# Patient Record
Sex: FEMALE | Employment: STUDENT | URBAN - METROPOLITAN AREA
[De-identification: names, ages, dates, MRNs, and addresses within clinical notes are randomized per-mention and may not be internally consistent; named-entity substitution may affect disease eponyms.]

---

## 2021-05-03 ENCOUNTER — EMPLOYEE HEALTH (OUTPATIENT)
Dept: OTHER | Facility: HOSPITAL | Age: 21
End: 2021-05-03
Attending: PREVENTIVE MEDICINE

## 2021-05-03 DIAGNOSIS — Z11.1 SCREENING-PULMONARY TB: Primary | ICD-10-CM

## 2021-05-03 PROCEDURE — 86480 TB TEST CELL IMMUN MEASURE: CPT

## 2022-10-24 ENCOUNTER — IMMUNIZATION (OUTPATIENT)
Dept: LAB | Facility: HOSPITAL | Age: 22
End: 2022-10-24
Attending: PREVENTIVE MEDICINE

## 2022-10-24 DIAGNOSIS — Z23 NEED FOR VACCINATION: Primary | ICD-10-CM

## 2022-10-24 PROCEDURE — 90471 IMMUNIZATION ADMIN: CPT

## 2025-01-07 ENCOUNTER — OFFICE VISIT (OUTPATIENT)
Facility: LOCATION | Age: 25
End: 2025-01-07
Payer: COMMERCIAL

## 2025-01-07 VITALS
HEART RATE: 76 BPM | DIASTOLIC BLOOD PRESSURE: 64 MMHG | WEIGHT: 116 LBS | BODY MASS INDEX: 19.81 KG/M2 | SYSTOLIC BLOOD PRESSURE: 110 MMHG | HEIGHT: 64 IN

## 2025-01-07 DIAGNOSIS — E05.90 HYPERTHYROIDISM: ICD-10-CM

## 2025-01-07 DIAGNOSIS — E05.00 GRAVES DISEASE: Primary | ICD-10-CM

## 2025-01-07 DIAGNOSIS — T50.905S ADVERSE EFFECT OF DRUG, SEQUELA: ICD-10-CM

## 2025-01-07 DIAGNOSIS — E07.9 THYROID DISEASE: ICD-10-CM

## 2025-01-07 PROCEDURE — 99205 OFFICE O/P NEW HI 60 MIN: CPT | Performed by: INTERNAL MEDICINE

## 2025-01-07 RX ORDER — PROPYLTHIOURACIL 50 MG/1
50 TABLET ORAL 2 TIMES DAILY
Qty: 60 TABLET | Refills: 0 | Status: SHIPPED | OUTPATIENT
Start: 2025-01-07

## 2025-01-07 NOTE — PROGRESS NOTES
New Patient Evaluation - History and Physical    CONSULT - Reason for Visit:    Graves, hyperthyroid and reaction to MMI  Requesting Physician: Elana Dubin, PA   ..No primary care provider on file.  No referring provider defined for this encounter.    CHIEF COMPLAINT:    Chief Complaint   Patient presents with    Thyroid Problem     Consult         HISTORY OF PRESENT ILLNESS:   Jania Tapia is a 24 year old female who presents with  Graves, hyperthyroid and reaction to MMI  Previous endo Brian Becerril MD  . Reviewed notes. Pt has new insurance now. She works at [employer] in [employer].   Outside records showed high TSH, hyperthyroid.   She was dx in 10/2024 w/ hyperthyroid on routine labs  W/u showed TSI and thyroiditis on US.    She was started on MMI 10 mg bid 12/9/2024  1st 2  weeks she as ok   On 12/23/2024, had hives and itching  stopped 1/1/2025. She tried lower doses 10 mg every day but still had the hives.   Lino resolved when she stopped MMI       Has tremors.   Denied other hyperthyroid sx. She feels fine now.   She is zertec now  She is vit D , probiotic,   M and MGF with graves disease      Compression symptoms: denied except the underlined ones SOB, dysphagia, odynophagia, change in voice, hoarseness, neck pain or neck mass.     The patient endorses the bolded symptoms: intolerance to cold, constipation, decreased lacrimation, fatigue; anxiety, heat intolerance, insomnia, tremors, wt loss, wt gain, irregular menses, lid lag, palpitations, proptosis, thinning hair; dyspnea, difficulty breathing when lying down, dysphagia, sensation of food getting stuck in the throat, choking sensation when lying flat, pooling of saliva, dysphonia, voice changes, hoarseness; none.    No eye or vision changes.   No heat or cold intolerance  Hair and skin: hives   Menstrual hx: No LMP recorded. LMP 12/19, regular , not on ocp  Neck surgery: No  Neck radiation: No   Biotin: no  Turmeric:no  Family history of thyroid  cancer in 1st degree relatives: no         ASSESSMENT AND PLAN:  Jania Tapia is a 24 year old female who presents with    Graves, hyperthyroid and reaction to MMI    Plan  Labs today   Avoid pregnancy   Will start low dose PTU 50 mg  twice a day and stop if gets reaction to it   Will consider propranolol   Will refer to get surgery consult to discuss surgery option   Will refer to ophtho eye exam for graves eye disease - does not have vision insurance. So might skip     Discussed with patient possible dx, treatment options, SILVA vs surgery vs meds. Discussed thyroid and pregnancy (if applicable), wt gain, eye disease, and SE of meds and SILVA. PTU/Methimazole may cause significant bone marrow depression; the most severe manifestation is agranulocytosis. May also cause Hepatotoxicity (including acute liver failure) Symptoms suggestive of hepatic dysfunction (eg, anorexia, pruritus, right upper quadrant pain) should prompt evaluation. If you have nausea, vomiting, abdominal pain, jaundice- yellow skin or eye color-, dark urine, light stool color, fever, sore throat or infection, call us or the PCP.  Remission rate of ~ 40% with use of antithyroid drugs for 1-1.5 years, their side effects, monitoring, and follow-up issues, specifically agranulocytosis, liver toxicity, anaphylaxis, rash, lupus like syndrome, metallic taste in the mouth, and joint aches. We discussed that patient should discontinue methimazole at the earliest sign of a fever, sore throat, or other infection, should call our office and have WBC count with differential done. The drug should be held until the result is available.     If applicable, Hyperthyroid pregnancy counseling. General counseling on contraception (Z30.09).  We discussed in details the adverse effect of uncontrolled hyperthyroidism on pregnancy    Also discussed the risk of Methimazole on the fetus.   Please notify us if you are pregnant or you are planning to get pregnant.   Print out was  given to the Pt    HYPERTENSION  [email or web address]    If blood pressure is high, monitor blood pressure at home and follow up with primary care.   Some people's blood pressure readings differ between the doctor's office and at home.     Am I at Risk?  There is no single identifiable cause of hypertension. Many factors can contribute, including:   Being overweight or obese   Eating a diet high in sodium (salt)   Not getting enough physical activity   Being older or    Smoking   Drinking too much alcohol   Having a personal or family history of hypertension   Having other chronic diseases, especially diabetes or kidney disease      PAST MEDICAL HISTORY:   History reviewed. No pertinent past medical history.  Graves, hyperthyroid   PAST SURGICAL HISTORY:   History reviewed. No pertinent surgical history.  no  CURRENT MEDICATIONS:     propylthiouracil 50 MG Oral Tab Take 1 tablet (50 mg total) by mouth in the morning and 1 tablet (50 mg total) before bedtime. 60 tablet 0       ALLERGIES:  Allergies[1]    SOCIAL HISTORY:    Social History     Socioeconomic History    Marital status: Single   Tobacco Use    Smoking status: Never    Smokeless tobacco: Never     Works in the labs  Smoking no  Marijuana no  Etoh rare  Drugs no  Single, not planning pregnancy     FAMILY HISTORY:   History reviewed. No pertinent family history.  M and MGF with graves disease     REVIEW OF SYSTEMS:  All negative other than HPI    PHYSICAL EXAM:   Height: 5' 4\" (162.6 cm) (01/07 1001)  Weight: 116 lb (52.6 kg) (01/07 1001)  BSA (Calculated - sq m): 1.55 sq meters (01/07 1001)  Pulse: 76 (01/07 1031)  BP: 110/64 (01/07 1001)  Temp: --  Do Not Use - Resp Rate: --  SpO2: --    Body mass index is 19.91 kg/m².  Tremors   CONSTITUTIONAL:  Awake and alert. Age appropriate, good hygiene not in acute distress. Well-nourished and well developed. no  acute distress   PSYCH:    Normal mood and affect,   cooperative  Neuro: speech is clear. Awake, alert, no aphasia, no facial asymmetry,    EYES:  No proptosis, no ptosis, conjunctiva normal  ENT:  Normocephalic, atraumatic  Eye: EOMI, normal lids, no discharge,    No rhinorrhea, moist oral mucosa  Neck: full range of motion  Neck/Thyroid: neck inspection: normal, No scar, No goiter   LUNGS:  No acute respiratory distress, non-labored respiration. Speaking full sentences  CARDIOVASCULAR:  regular rate   ABDOMEN:  No abdominal pain.   SKIN:  Skin is dry, no obvious rashes or lesions  EXTREMITIES: no gross abnormality   MSK: Moves extremities spontaneously. full range of motion in all major joints      DATA:     Pertinent data reviewed  12/2024 US thyroid IMPRESSION:     Diffusely heterogeneous thyroid without discrete nodule identified. Findings can be seen with thyroiditis     12/4/24 10:29 AM    TSI - External  <140 % baseline 194 High      12/4/24 10:29 AM    TRAb Antibody RAJAT method  < OR = 2.00 IU/L 4.29       12/4/24 10:29 AM    TSH - External  mIU/L 0.02       12/4/24 10:29 AM    T3, FREE - External  2.3 - 4.2 pg/mL 11.8 High      ABSOLUTE NEUTROPHILS - External  1500 - 7800 cells/uL 2,08     10/29/24  8:15 AM    TSH - External  mIU/L 0.02 Low        10/29/24  8:15 AM    VITAMIN D,25-OH,TOTAL,IA - External  30 - 100 ng/mL 68     10/29/24  8:15 AM    T4, FREE - External  0.8 - 1.8 ng/dL 3 High        3/28/24  9:04 AM    ALT  0 - 33 U/L 21       10/17/20  7:56 AM    TSH - External  mIU/L 1.63     3/28/24  9:04 AM    AST  0 - 32 U/L 23     No results found.    No results for input(s): \"TSH\", \"T4F\", \"T3F\", \"THYP\" in the last 72 hours.  No results found for: \"TSH\"  No results found for: \"A1C\"        No results for input(s): \"TSH\", \"T4F\", \"T3F\", \"THYP\" in the last 72 hours.  No results found.    Orders Placed This Encounter   Procedures    Free T3 (Triiodothryronine)    Free T4, (Free Thyroxine)    Free T4, (Free  Thyroxine)     Orders Placed This Encounter    Free T3 (Triiodothryronine)     Order Specific Question:   Release to patient     Answer:   Immediate    Free T4, (Free Thyroxine)     Standing Status:   Future     Standing Expiration Date:   1/7/2026     Order Specific Question:   Release to patient     Answer:   Immediate    Free T4, (Free Thyroxine)     Standing Status:   Future     Standing Expiration Date:   1/7/2026     Order Specific Question:   Release to patient     Answer:   Immediate    propylthiouracil 50 MG Oral Tab     Sig: Take 1 tablet (50 mg total) by mouth in the morning and 1 tablet (50 mg total) before bedtime.     Dispense:  60 tablet     Refill:  0          This is a specialized patient consultation in endocrinology and required comprehensive review of prior records, as well as current evaluation, with time required for consideration of complex endocrine issues and consultation. For this visit, I personally interviewed the patient, and family member if accompanied, performed the pertinent parts of the history and physical examination. ROS included screening for appropriate endocrine conditions.   Today's diagnosis and plan were reviewed in detail with the patient who states understanding and agrees with plan. I discussed with the patient possible diagnosis, differential diagnosis, need for work up, treatment options, alternatives and side effects.     Please see note for details about time spent which includes:   · pre-visit preparation  · reviewing records  · face to face time with the patient   · timely documentation of the encounter  · ordering medications/tests  · communication with care team  · care coordination    I appreciate the opportunity to be part of your patient's medical care and will keep you, as the referring and primary physicians, informed about the care of your patient. Please feel free to contact me should you have any questions.    The 21st Century Cures Act makes medical  notes like these available to patients in the interest of transparency. Please be advised this is a medical document. Medical documents are intended to carry relevant information, facts as evident, and the clinical opinion of the practitioner. The medical note is intended as peer to peer communication and may appear blunt or direct. It is written in medical language and may contain abbreviations or verbiage that are unfamiliar.   Shakeel Alcantara MD             [1] No Known Allergies

## 2025-01-08 ENCOUNTER — LAB ENCOUNTER (OUTPATIENT)
Dept: LAB | Facility: HOSPITAL | Age: 25
End: 2025-01-08
Attending: INTERNAL MEDICINE
Payer: COMMERCIAL

## 2025-01-08 DIAGNOSIS — E05.90 HYPERTHYROIDISM: ICD-10-CM

## 2025-01-08 DIAGNOSIS — E07.9 THYROID DISEASE: ICD-10-CM

## 2025-01-08 DIAGNOSIS — E05.00 GRAVES DISEASE: ICD-10-CM

## 2025-01-08 DIAGNOSIS — T50.905S ADVERSE EFFECT OF DRUG, SEQUELA: ICD-10-CM

## 2025-01-08 LAB
T3FREE SERPL-MCNC: 8.48 PG/ML (ref 2.4–4.2)
T4 FREE SERPL-MCNC: 2.6 NG/DL (ref 0.8–1.7)

## 2025-01-08 PROCEDURE — 84439 ASSAY OF FREE THYROXINE: CPT

## 2025-01-08 PROCEDURE — 36415 COLL VENOUS BLD VENIPUNCTURE: CPT | Performed by: INTERNAL MEDICINE

## 2025-01-08 PROCEDURE — 84481 FREE ASSAY (FT-3): CPT | Performed by: INTERNAL MEDICINE

## 2025-01-21 ENCOUNTER — OFFICE VISIT (OUTPATIENT)
Facility: LOCATION | Age: 25
End: 2025-01-21
Payer: COMMERCIAL

## 2025-01-21 ENCOUNTER — PATIENT MESSAGE (OUTPATIENT)
Facility: LOCATION | Age: 25
End: 2025-01-21

## 2025-01-21 VITALS — BODY MASS INDEX: 19.81 KG/M2 | HEIGHT: 64 IN | WEIGHT: 116 LBS

## 2025-01-21 DIAGNOSIS — E05.00 GRAVES DISEASE: Primary | ICD-10-CM

## 2025-01-21 PROCEDURE — 99203 OFFICE O/P NEW LOW 30 MIN: CPT | Performed by: STUDENT IN AN ORGANIZED HEALTH CARE EDUCATION/TRAINING PROGRAM

## 2025-01-21 RX ORDER — TRIAMCINOLONE ACETONIDE 0.25 MG/G
CREAM TOPICAL
COMMUNITY
Start: 2023-02-07

## 2025-01-21 RX ORDER — SPIRONOLACTONE 25 MG/1
25 TABLET ORAL DAILY
COMMUNITY
Start: 2022-07-14

## 2025-01-21 RX ORDER — ISOTRETINOIN 20 MG/1
1 CAPSULE ORAL DAILY
COMMUNITY
Start: 2023-01-06

## 2025-01-21 RX ORDER — ISOTRETINOIN 30 MG/1
1 CAPSULE ORAL 2 TIMES DAILY
COMMUNITY
Start: 2024-04-29

## 2025-01-21 RX ORDER — ISOTRETINOIN 40 MG/1
1 CAPSULE ORAL DAILY
COMMUNITY
Start: 2024-04-01

## 2025-01-21 RX ORDER — METHIMAZOLE 10 MG/1
TABLET ORAL
COMMUNITY
Start: 2024-12-06

## 2025-01-21 RX ORDER — ISOTRETINOIN 30 MG/1
30 CAPSULE ORAL DAILY
COMMUNITY
Start: 2023-01-06

## 2025-01-22 NOTE — PROGRESS NOTES
[place]  OTOLARYNGOLOGY - HEAD & NECK SURGERY    1/21/2025     Reason for Consultation:   Graves' hyperthyroidism    History of Present Illness:   Patient is a pleasant 24 year old female who is being seen for history of Graves' hyperthyroidism.  The patient was tried on medical therapy with methimazole and ended up having an allergic reaction.  She has recently switched to PTU.  She is currently being managed by Dr. Alcantara.  She was referred to me for possible surgical consultation.  The patient has not had any previous neck surgery.  She does have a history of Graves' disease in her family.  She is not currently thinking about pregnancy but states that it is a possibility in the next few years.  She did have a thyroid ultrasound which showed just heterogeneity of the thyroid gland without any distinct nodules.    Past Medical History  History reviewed. No pertinent past medical history.    Past Surgical History  History reviewed. No pertinent surgical history.    Family History  History reviewed. No pertinent family history.    Social History  Pediatric History   Patient Parents    Vianey Khan (Mother)     Other Topics Concern    Not on file   Social History Narrative    Not on file           Current Medications:  Current Outpatient Medications   Medication Sig Dispense Refill    propylthiouracil 50 MG Oral Tab Take 1 tablet (50 mg total) by mouth in the morning and 1 tablet (50 mg total) before bedtime. 60 tablet 0    Isotretinoin 20 MG Oral Cap Take 1 capsule (20 mg total) by mouth daily. (Patient not taking: Reported on 1/21/2025)      Isotretinoin 30 MG Oral Cap Take 1 capsule (30 mg total) by mouth 2 (two) times daily. (Patient not taking: Reported on 1/21/2025)      Isotretinoin 30 MG Oral Cap Take 1 capsule (30 mg total) by mouth daily. (Patient not taking: Reported on 1/21/2025)      Isotretinoin 40 MG Oral Cap Take 1 capsule (40 mg total) by mouth daily. (Patient not taking: Reported on 1/21/2025)       methIMAzole 10 MG Oral Tab  (Patient not taking: Reported on 1/21/2025)      spironolactone 25 MG Oral Tab Take 1 tablet (25 mg total) by mouth daily. (Patient not taking: Reported on 1/21/2025)      triamcinolone 0.025 % External Cream Apply to aa on the arms bid for 14 days then PRN for flares. (Patient not taking: Reported on 1/21/2025)      Desogestrel-Ethinyl Estradiol (ENSKYCE) 0.15-30 MG-MCG Oral Tab Take 1 tablet by mouth daily. (Patient not taking: Reported on 1/21/2025) 3 Package 1    tretinoin 0.025 % External Cream Apply pea sized amount to entire face, avoiding eyelids (Patient not taking: Reported on 1/21/2025) 45 g 3    TRETINOIN 0.05 % External Cream APPLY TO AFFECTED AREAS ON FACE AND BACK DAILY AT BEDTIME AS TOLERATED (Patient not taking: Reported on 1/21/2025) 45 g 0    Hydroquinone 4 % External Cream Apply to discolored areas on face and back BID (Patient not taking: Reported on 1/21/2025) 56.8 g 3       Allergies  Allergies[1]    Review of Systems:   A comprehensive 10 point review of systems was completed.  Pertinent positives and negatives noted in the the HPI.    Physical Exam:   Height 5' 4\" (1.626 m), weight 116 lb (52.6 kg).    GENERAL: No acute distress, Comfortable appearing  FACE: HB 1/6, Normal Animation  HEAD: Normocephalic  EYES: EOMI, pupils equil  EARS: Bilateral Auricles Symmetric  NOSE: Nares patent bilaterally  ORAL CAVITY: Tongue mobile, Oropharynx clear, Floor of mouth clear, Posterior oropharynx normal  NECK: No palpable lymphadenopathy, thyroid palpable but small, nontender    Results:     Laboratory Data:  Lab Results   Component Value Date    T4F 2.6 (H) 01/08/2025         Imaging:  PROCEDURE: US THYROID     HISTORY: Hypothyroidism.     TECHNIQUE: Ultrasound of the thyroid gland was performed.     COMPARISON: None.     FINDINGS:     MEASUREMENTS:     Right lobe: 4.6 x 1.5 x 1.6 cm (CC x AP x WIDTH)   Left lobe:  4.8 x 1.6 x 1.6 cm (CC x AP x WIDTH)   Isthmus: 0.2 cm in  thickness     Echogenicity: The thyroid gland is diffusely heterogenous in echogenicity.     Vascularity: The thyroid gland has normal vascularity.     Nodules (Per ACR guidelines, up to four nodules described): None.       IMPRESSION:     Diffusely heterogeneous thyroid without discrete nodule identified. Findings can be seen with thyroiditis.     FINAL REPORT   Attending Radiologist:  Blaze Vyas MD   Date Signed Off:  12/11/2024 09:24       Impression:       ICD-10-CM    1. Graves disease  E05.00            Recommendations:  I did have an in-depth discussion with the patient regarding thyroid surgery.  We did discuss some of the risks of thyroid surgery including temporary or persistent hypocalcemia, temporary or persistent hoarseness due to injury to the recurrent laryngeal nerves.  We also discussed some bleeding, infection, hematoma or seroma, scarring, need for further surgery.  We did however also discussed the benefits of surgery which would include being able to avoid medications like PTU and methimazole.  We did discuss that treatment of hypothyroidism postsurgically is much easier to manage.  She will give this some thought and I will provide her with a handout via [employer] regarding thyroidectomy.  She will reach out to me if she wishes to pursue surgery.    Thank you for allowing me to participate in the care of your patient.    Reynaldo Rizvi, DO   Otolaryngology/Rhinology, Sinus, and Endoscopic Skull Base Surgery  [facility]   [address] [place] [address] [address] [place] [id number]  Phone [phone]  Fax [phone]  1/21/2025  8:09 PM  1/21/2025          [1]   Allergies  Allergen Reactions    Methimazole [Thiamazole] HIVES

## 2025-01-23 ENCOUNTER — TELEPHONE (OUTPATIENT)
Dept: OTOLARYNGOLOGY | Facility: CLINIC | Age: 25
End: 2025-01-23

## 2025-01-23 NOTE — TELEPHONE ENCOUNTER
Dr. Rizvi, patient wants to move forward with the surgery, she would like to get it done in March.

## 2025-01-31 DIAGNOSIS — E04.1 THYROID NODULE: Primary | ICD-10-CM

## 2025-02-06 ENCOUNTER — TELEPHONE (OUTPATIENT)
Facility: LOCATION | Age: 25
End: 2025-02-06

## 2025-02-06 NOTE — TELEPHONE ENCOUNTER
Rec'd Family Medical Leave Act/disability forms via [employer] on 2/4/25. Rec'd Release of Information on 2/6/25 via [employer]. Logged for processing. Details below. Patient would also like forms uploaded to [employer].    Type of Leave: Continuous  Reason for Leave: Total thyroidectomy sx 3/17/25  Start date of leave: 3/17/25 to 3/30/25, Return to work 3/31/25  End date of leave:  How many flare ups per month/length?:  How many appts per month/length?:   Was Fee and Turnaround info Given?:

## 2025-02-10 ENCOUNTER — LAB ENCOUNTER (OUTPATIENT)
Dept: LAB | Facility: HOSPITAL | Age: 25
End: 2025-02-10
Attending: INTERNAL MEDICINE
Payer: COMMERCIAL

## 2025-02-10 DIAGNOSIS — E05.00 GRAVES DISEASE: ICD-10-CM

## 2025-02-10 LAB — T4 FREE SERPL-MCNC: 1.5 NG/DL (ref 0.8–1.7)

## 2025-02-10 PROCEDURE — 36415 COLL VENOUS BLD VENIPUNCTURE: CPT

## 2025-02-10 PROCEDURE — 84439 ASSAY OF FREE THYROXINE: CPT

## 2025-02-14 DIAGNOSIS — E05.00 GRAVES DISEASE: ICD-10-CM

## 2025-02-14 RX ORDER — PROPYLTHIOURACIL 50 MG/1
50 TABLET ORAL 2 TIMES DAILY
Qty: 60 TABLET | Refills: 0 | Status: SHIPPED | OUTPATIENT
Start: 2025-02-14

## 2025-02-14 NOTE — TELEPHONE ENCOUNTER
Endocrine refill protocol for medications for hypothyroidism and hyperthyroidism    Protocol Criteria:  FAILED Reason: Abnormal labs    If all below requirements are met, send a 90-day supply with 1 refill per provider protocol.    Verify appointment with Endocrinology completed in the last 12 months or scheduled in the next 6 months.    Normal TSH result in the past 12 months   Review recent telephone encounters and mychart communications with patient to ensure a dose change has not occurred since last office visit that was not updated in the medication history list     Last completed office visit:1/7/2025 Shakeel Alcantara MD   Next scheduled Follow up:   Future Appointments   Date Time Provider Department Center   2/25/2025  8:00 AM Shakeel Alcantara MD [facility]      Last TSH result: No results found for: \"TSH\"- 12/4/24 was 0.02

## 2025-02-14 NOTE — TELEPHONE ENCOUNTER
Dr. Rizvi,    **The ACKNOWLEDGE button has been moved to the top right ribbon**    Please sign off on form if you agree to: DISABILITY FOR SURGERY  (place your signature on the first page only)  -From your Inbasket, Highlight the patient and click Chart  -Double click the 2/06/25 Forms Completion telephone encounter  -Scroll down to the Media section  -Click the blue Hyperlink: Disability Dr Rizvi 2/14/25    -Click Acknowledge located in the top right ribbon/menu  -Drag the mouse into the blank space of the document and a + sign will appear. Left click to  electronically sign the document.    Thank you,    MIREYA.

## 2025-02-17 NOTE — TELEPHONE ENCOUNTER
Forms completed and signed. E-Faxed to [employer] [phone]. Forms sent to patient via [employer]. Fax confirmation received.

## 2025-02-25 ENCOUNTER — OFFICE VISIT (OUTPATIENT)
Facility: LOCATION | Age: 25
End: 2025-02-25
Payer: COMMERCIAL

## 2025-02-25 VITALS
SYSTOLIC BLOOD PRESSURE: 80 MMHG | HEIGHT: 64 IN | DIASTOLIC BLOOD PRESSURE: 50 MMHG | BODY MASS INDEX: 20.32 KG/M2 | HEART RATE: 71 BPM | WEIGHT: 119 LBS

## 2025-02-25 DIAGNOSIS — T50.905S ADVERSE EFFECT OF DRUG, SEQUELA: ICD-10-CM

## 2025-02-25 DIAGNOSIS — E07.9 THYROID DISEASE: ICD-10-CM

## 2025-02-25 DIAGNOSIS — E05.90 HYPERTHYROIDISM: ICD-10-CM

## 2025-02-25 DIAGNOSIS — E05.00 GRAVES DISEASE: Primary | ICD-10-CM

## 2025-02-25 PROCEDURE — 99214 OFFICE O/P EST MOD 30 MIN: CPT | Performed by: INTERNAL MEDICINE

## 2025-02-25 RX ORDER — LEVOTHYROXINE SODIUM 75 UG/1
75 TABLET ORAL
Qty: 90 TABLET | Refills: 1 | Status: SHIPPED | OUTPATIENT
Start: 2025-02-25 | End: 2025-08-24

## 2025-02-25 NOTE — PATIENT INSTRUCTIONS
Surgery 3/17  Avoid pregnancy    PTU 50 mg  twice a day till your surgery   Post op will start levothyroxine 75 mcg/day   Thyroid medication dose levothyroxine   Take you medication on empty stomach, not with any other medication or food. Wait 60 minutes before eating. Wait 4 hours before taking Vitamins, Calcium or iron.  Wait 60 minutes before eating. Do not take the medication on the morning of the lab test. Do not take Biotin/Turmeric 1 week before the test.    During pregnancy, we need to increase thyroid medication dose (take double your usual dose on 2 days a week, the rest of the days take the usual dose) and CALL US TO SCHEDULE AN APPT TO DO LABS AND ADJUST THE DOSE.          [email or web address].org/patient-thyroid-information/  https://www.thyroid.org/thyroid-information/      Don’t take your thyroid hormone at the same time as:  Walnuts.  Soybean flour.  Cottonseed meal.  Iron supplements or multivitamins containing iron.  Calcium supplements.  Antacids that contain aluminum, magnesium or calcium.  Some ulcer medicines, such as sucralfate (Carafate).  Some cholesterol-lowering drugs, such as those containing cholestyramine (Prevalite, Locholest) and colestipol (Colestid).  To avoid possible problems, eat these foods or use these products several hours before or after you take your thyroid medicine.    Supplements containing biotin, common in hair and nail products, can make it hard to measure how much thyroid hormone is in the body. Biotin does not affect thyroid hormone levels. But supplements that have biotin should be stopped for at least a week before measuring thyroid function so that the measurement is correct.

## 2025-02-25 NOTE — PROGRESS NOTES
Reason for Visit:  Graves, hyperthyroid and reaction to MMI  Requesting Physician: Elana Dubin, PA ..No primary care provider on file.  No referring provider defined for this encounter.    CHIEF COMPLAINT:    Chief Complaint   Patient presents with    Graves     F/u        HISTORY OF PRESENT ILLNESS:   Jania Tapia is a 24 year old female who presents with  Graves, hyperthyroid and reaction to MMI  Previous endo Brian Becerril MD  . Reviewed notes. Pt has new insurance now. 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   Outside records showed high TSI and hyperthyroid.  She was Dx in 10/2024 w/ hyperthyroid on routine labs.  W/u showed TSI and thyroiditis on US.    She was started on MMI 10 mg bid 12/9/2024.  On 12/23/2024, had hives and itching  stopped 1/1/2025. She tried lower doses 10 mg every day but still had the hives.   Lino resolved when she stopped MMI   1/2025 started PTU 50 mg bid and doing well on it now.   Not itching now   Gained wt   No palpitations   BP on the lower side but no orthostatic changes   She is zertec now  She is vit D , probiotic,   M and MGF with graves disease    Hair and skin: hives   Menstrual hx: No LMP recorded. LMP 12/19, regular , not on ocp  Neck surgery: No  Neck radiation: No   Biotin: no  Turmeric:no  Family history of thyroid cancer in 1st degree relatives: no       Wt Readings from Last 6 Encounters:   02/25/25 119 lb (54 kg)   01/21/25 116 lb (52.6 kg)   01/07/25 116 lb (52.6 kg)         ASSESSMENT AND PLAN:  Jania Tapia is a 24 year old female who presents with   Graves, hyperthyroid and reaction to MMI    Plan  Surgery 3/17 with Dr Cee   Avoid pregnancy    PTU 50 mg  twice a day till your surgery   Post op will start levothyroxine 75 mcg/day   Thyroid medication dose levothyroxine   Take you medication on empty stomach, not with any other medication or food. Wait 60 minutes before eating. Wait 4 hours before taking Vitamins, Calcium or iron.  Wait 60 minutes before  eating. Do not take the medication on the morning of the lab test. Do not take Biotin/Turmeric 1 week before the test.    During pregnancy, we need to increase thyroid medication dose (take double your usual dose on 2 days a week, the rest of the days take the usual dose) and CALL US TO SCHEDULE AN APPT TO DO LABS AND ADJUST THE DOSE.          [email or web address]/patient-thyroid-[email or web address]  [email or web address]://[email or web address]/thyroid-information/      Don’t take your thyroid hormone at the same time as:  Walnuts.  Soybean flour.  Cottonseed meal.  Iron supplements or multivitamins containing iron.  Calcium supplements.  Antacids that contain aluminum, magnesium or calcium.  Some ulcer medicines, such as sucralfate (Carafate).  Some cholesterol-lowering drugs, such as those containing cholestyramine (Prevalite, Locholest) and colestipol (Colestid).  To avoid possible problems, eat these foods or use these products several hours before or after you take your thyroid medicine.    Supplements containing biotin, common in hair and nail products, can make it hard to measure how much thyroid hormone is in the body. Biotin does not affect thyroid hormone levels. But supplements that have biotin should be stopped for at least a week before measuring thyroid function so that the measurement is correct.           PAST MEDICAL HISTORY:   History reviewed. No pertinent past medical history.  Graves, hyperthyroid   PAST SURGICAL HISTORY:   History reviewed. No pertinent surgical history.  no  CURRENT MEDICATIONS:     levothyroxine 75 MCG Oral Tab Take 1 tablet (75 mcg total) by mouth before breakfast. 90 tablet 1    propylthiouracil 50 MG Oral Tab Take 1 tablet (50 mg total) by mouth in the morning and 1 tablet (50 mg total) before bedtime. 60 tablet 0       ALLERGIES:  Allergies[1]    SOCIAL HISTORY:    Social History     Socioeconomic History    Marital status: Single   Tobacco Use    Smoking status: Never    Smokeless tobacco: Never    Vaping Use    Vaping status: Never Used   Substance and Sexual Activity    Alcohol use: Yes    Drug use: Never     Works in the labs  Smoking no  Marijuana no  Etoh rare  Drugs no  Single, not planning pregnancy     FAMILY HISTORY:   History reviewed. No pertinent family history.  M and MGF with graves disease      PHYSICAL EXAM:   Height: 5' 4\" (162.6 cm) (02/25 0807)  Weight: 119 lb (54 kg) (02/25 0807)  BSA (Calculated - sq m): 1.57 sq meters (02/25 0807)  Pulse: 71 (02/25 0807)  BP: 80/50 (02/25 0807)  Temp: --  Do Not Use - Resp Rate: --  SpO2: --    Body mass index is 20.43 kg/m².  Tremors Rt > Lt      DATA:     Pertinent data reviewed  12/2024 US thyroid Diffusely heterogeneous thyroid without discrete nodule identified. Findings can be seen with thyroiditis      02/10/25 15:08   T4,Free (Direct)  1.5     12/4/24 10:29 AM    TSI - External  <140 % baseline 194 High      12/4/24 10:29 AM    TRAb Antibody RAJAT method  < OR = 2.00 IU/L 4.29       ABSOLUTE NEUTROPHILS - External  1500 - 7800 cells/uL 2,08     10/29/24  8:15 AM    TSH - External  mIU/L 0.02 Low        10/29/24  8:15 AM    VITAMIN D,25-OH,TOTAL,IA - External  30 - 100 ng/mL 68     10/29/24  8:15 AM    T4, FREE - External  0.8 - 1.8 ng/dL 3 High        3/28/24  9:04 AM    ALT  0 - 33 U/L 21       10/17/20  7:56 AM    TSH - External  mIU/L 1.63     3/28/24  9:04 AM    AST  0 - 32 U/L 23     No results found.    No results for input(s): \"TSH\", \"T4F\", \"T3F\", \"THYP\" in the last 72 hours.  No results found for: \"TSH\"  No results found for: \"A1C\"        No results for input(s): \"TSH\", \"T4F\", \"T3F\", \"THYP\" in the last 72 hours.  No results found.    Orders Placed This Encounter   Procedures    TSH and Free T4     Orders Placed This Encounter    TSH and Free T4     Standing Status:   Future     Standing Expiration Date:   2/25/2026     Order Specific Question:   Release to patient     Answer:   Immediate    levothyroxine 75 MCG Oral Tab     Sig: Take 1  tablet (75 mcg total) by mouth before breakfast.     Dispense:  90 tablet     Refill:  1          This is a specialized patient consultation in endocrinology and required comprehensive review of prior records, as well as current evaluation, with time required for consideration of complex endocrine issues and consultation. For this visit, I personally interviewed the patient, and family member if accompanied, performed the pertinent parts of the history and physical examination. ROS included screening for appropriate endocrine conditions.   Today's diagnosis and plan were reviewed in detail with the patient who states understanding and agrees with plan. I discussed with the patient possible diagnosis, differential diagnosis, need for work up, treatment options, alternatives and side effects.     Please see note for details about time spent which includes:   · pre-visit preparation  · reviewing records  · face to face time with the patient   · timely documentation of the encounter  · ordering medications/tests  · communication with care team  · care coordination    I appreciate the opportunity to be part of your patient's medical care and will keep you, as the referring and primary physicians, informed about the care of your patient. Please feel free to contact me should you have any questions.    The 21st Century Cures Act makes medical notes like these available to patients in the interest of transparency. Please be advised this is a medical document. Medical documents are intended to carry relevant information, facts as evident, and the clinical opinion of the practitioner. The medical note is intended as peer to peer communication and may appear blunt or direct. It is written in medical language and may contain abbreviations or verbiage that are unfamiliar.   Shakeel Alcantara MD             [1]   Allergies  Allergen Reactions    Methimazole [Thiamazole] HIVES

## 2025-03-10 ENCOUNTER — PATIENT MESSAGE (OUTPATIENT)
Facility: LOCATION | Age: 25
End: 2025-03-10

## 2025-03-10 RX ORDER — FERROUS SULFATE 324(65)MG
1 TABLET, DELAYED RELEASE (ENTERIC COATED) ORAL AS NEEDED
COMMUNITY

## 2025-03-13 NOTE — TELEPHONE ENCOUNTER
Advised patient that pre admission nurse will contact patient regarding medications to take the day of and hold, last time to drink water. Advised pt that other questions to be asked to  the day of surgery.

## 2025-03-14 NOTE — DISCHARGE INSTRUCTIONS
Having Thyroid Surgery  You are having surgery to remove part or all of your thyroid. The thyroid is a gland in the front of the neck. It sits just below the voice box (larynx). The gland’s main job is to make thyroid hormone. This helps control the body’s metabolism. Thyroid surgery may be done to treat an enlarged thyroid (goiter). Or it may be done to remove a lump (nodule). It may also be done to treat an overactive gland (hyperthyroid). Or it may be done to treat a gland that may have cancer cells.  Getting ready for surgery  Tell your provider about all the medicines you take. This includes prescription and over-the-counter medicines. It includes aspirin and other blood thinners. It also includes vitamins, herbs, and other supplements. You may need to stop taking some medicines.  Follow any directions you're given for not eating or drinking before the surgery.  Write down questions you have about the surgery. This helps to make sure the healthcare provider addresses all your concerns before you agree to the procedure. Ask the surgeon how much experience they've had doing thyroid surgeries.    During the surgery  An IV (intravenous) line will be put in your arm or hand. You’ll receive fluids and medicines through the IV during the surgery.  You’ll be given general anesthesia to keep you asleep and pain-free during the surgery.  A cut (incision) is made in your neck, along a crease in your skin.  The incision is then closed with surgical strips, clips, or stitches (sutures).      The incision is made at the base of your neck.      After the surgery  It may take a few hours for the anesthesia to wear off. You'll get up and walk around soon after the surgery. You'll be watched for bleeding.  You may spend some time staying in the hospital or surgery center after the surgery.  In most cases, you can eat and drink the evening after the surgery. You may still have an upset stomach (nausea) from the anesthesia.  You  will be tested to make sure your parathyroid glands are working. The stress of surgery may stop them from working for a short time. If this happens, you may be given calcium and vitamin D pills for a few weeks  You may have a sore throat and a hoarse voice for 1 week or so after the surgery  You will have stitches that will need to be removed 8 days after surgery    Risks and possible complications  The risks and possible complications of this procedure include:  Bleeding  Infection  Damage to nerves in your voice box. This can lead to a hoarse voice. Often this hoarseness gets better over time. But in rare cases it may last.  Damage to the parathyroid glands or their blood supply. This can make them underactive (hypoparathyroidism). These glands control the amount of calcium in your blood. Often the hypoparathyroidism gets better over time. But you may need to take daily calcium pills for a long time, or for the rest of your life. You may also need to take vitamin D supplements.  Damage to the food pipe or airway, which is very rare  [employer] last reviewed this educational content on 10/1/2021  © 4461-6351 [employer]. All rights reserved. This information is not intended as a substitute for professional medical care. Always follow your healthcare professional's instructions.     HOME INSTRUCTIONS  AMBSURG HOME CARE INSTRUCTIONS: POST-OP ANESTHESIA  The medication that you received for sedation or general anesthesia can last up to 24 hours. Your judgment and reflexes may be altered, even if you feel like your normal self.      We Recommend:   Do not drive any motor vehicle or bicycle   Avoid mowing the lawn, playing sports, or working with power tools/applicances (power saws, electric knives or mixers)   That you have someone stay with you on your first night home   Do not drink alcohol or take sleeping pills or tranquilizers   Do not sign legal documents within 24 hours of your procedure   If you had  a nerve block for your surgery, take extra care not to put any pressure on your arm or hand for 24 hours    It is normal:  For you to have a sore throat if you had a breathing tube during surgery (while you were asleep!). The sore throat should get better within 48 hours. You can gargle with warm salt water (1/2 tsp in 4 oz warm water) or use a throat lozenge for comfort  To feel muscle aches or soreness especially in the abdomen, chest or neck. The achy feeling should go away in the next 24 hours  To feel weak, sleepy or \"wiped out\". Your should start feeling better in the next 24 hours.   To experience mild discomforts such as sore lip or tongue, headache, cramps, gas pains or a bloated feeling in your abdomen.   To experience mild back pain or soreness for a day or two if you had spinal or epidural anesthesia.   If you had laparoscopic surgery, to feel shoulder pain or discomfort on the day of surgery.   For some patients to have nausea after surgery/anesthesia    If you feel nausea or experience vomiting:   Try to move around less.   Eat less than usual or drink only liquids until the next morning   Nausea should resolve in about 24 hours    If you have a problem when you are at home:    Call your surgeons office   Discharge Instructions: After Your Surgery  You’ve just had surgery. During surgery, you were given medicine called anesthesia to keep you relaxed and free of pain. After surgery, you may have some pain or nausea. This is common. Here are some tips for feeling better and getting well after surgery.   Going home  Your healthcare provider will show you how to take care of yourself when you go home. They'll also answer your questions. Have an adult family member or friend drive you home. For the first 24 hours after your surgery:   Don't drive or use heavy equipment.  Don't make important decisions or sign legal papers.  Take medicines as directed.  Don't drink alcohol.  Have someone stay with you, if  needed. They can watch for problems and help keep you safe.  Be sure to go to all follow-up visits with your healthcare provider. And rest after your surgery for as long as your provider tells you to.   Coping with pain  If you have pain after surgery, pain medicine will help you feel better. Take it as directed, before pain becomes severe. Also, ask your healthcare provider or pharmacist about other ways to control pain. This might be with heat, ice, or relaxation. And follow any other instructions your surgeon or nurse gives you.      Stay on schedule with your medicine.     Tips for taking pain medicine  To get the best relief possible, remember these points:   Pain medicines can upset your stomach. Taking them with a little food may help.  Most pain relievers taken by mouth need at least 20 to 30 minutes to start to work.  Don't wait till your pain becomes severe before you take your medicine. Try to time your medicine so that you can take it before starting an activity. This might be before you get dressed, go for a walk, or sit down for dinner.  Constipation is a common side effect of some pain medicines. Call your healthcare provider before taking any medicines such as laxatives or stool softeners to help ease constipation. Also ask if you should skip any foods. Drinking lots of fluids and eating foods such as fruits and vegetables that are high in fiber can also help. Remember, don't take laxatives unless your surgeon has prescribed them.  Drinking alcohol and taking pain medicine can cause dizziness and slow your breathing. It can even be deadly. Don't drink alcohol while taking pain medicine.  Pain medicine can make you react more slowly to things. Don't drive or run machinery while taking pain medicine.  Your healthcare provider may tell you to take acetaminophen to help ease your pain. Ask them how much you're supposed to take each day. Acetaminophen or other pain relievers may interact with your  prescription medicines or other over-the-counter (OTC) medicines. Some prescription medicines have acetaminophen and other ingredients in them. Using both prescription and OTC acetaminophen for pain can cause you to accidentally overdose. Read the labels on your OTC medicines with care. This will help you to clearly know the list of ingredients, how much to take, and any warnings. It may also help you not take too much acetaminophen. If you have questions or don't understand the information, ask your pharmacist or healthcare provider to explain it to you before you take the OTC medicine.   Managing nausea  Some people have an upset stomach (nausea) after surgery. This is often because of anesthesia, pain, or pain medicine, less movement of food in the stomach, or the stress of surgery. These tips will help you handle nausea and eat healthy foods as you get better. If you were on a special food plan before surgery, ask your healthcare provider if you should follow it while you get better. Check with your provider on how your eating should progress. It may depend on the surgery you had. These general tips may help:   Don't push yourself to eat. Your body will tell you when to eat and how much.  Start off with clear liquids and soup. They're easier to digest.  Next try semi-solid foods as you feel ready. These include mashed potatoes, applesauce, and gelatin.  Slowly move to solid foods. Don’t eat fatty, rich, or spicy foods at first.  Don't force yourself to have 3 large meals a day. Instead eat smaller amounts more often.  Take pain medicines with a small amount of solid food, such as crackers or toast. This helps prevent nausea.  When to call your healthcare provider  Call your healthcare provider right away if you have any of these:   You still have too much pain, or the pain gets worse, after taking the medicine. The medicine may not be strong enough. Or there may be a complication from the surgery.  You feel too  sleepy, dizzy, or groggy. The medicine may be too strong.  Side effects such as nausea or vomiting. Your healthcare provider may advise taking other medicines to .  Skin changes such as rash, itching, or hives. This may mean you have an allergic reaction. Your provider may advise taking other medicines.  The incision looks different (for instance, part of it opens up).  Bleeding or fluid leaking from the incision site, and weren't told to expect that.  Fever of 100.4°F (38°C) or higher, or as directed by your provider.  Call 911  Call 911 right away if you have:   Trouble breathing  Facial swelling    If you have obstructive sleep apnea   You were given anesthesia medicine during surgery to keep you comfortable and free of pain. After surgery, you may have more apnea spells because of this medicine and other medicines you were given. The spells may last longer than normal.    At home:  Keep using the continuous positive airway pressure (CPAP) device when you sleep. Unless your healthcare provider tells you not to, use it when you sleep, day or night. CPAP is a common device used to treat obstructive sleep apnea.  Talk with your provider before taking any pain medicine, muscle relaxants, or sedatives. Your provider will tell you about the possible dangers of taking these medicines.  Contact your provider if your sleeping changes a lot even when taking medicines as directed.  [employer] last reviewed this educational content on 10/1/2021  © 5069-3487 [employer]. All rights reserved. This information is not intended as a substitute for professional medical care. Always follow your healthcare professional's instructions.

## 2025-03-17 ENCOUNTER — ANESTHESIA (OUTPATIENT)
Dept: SURGERY | Facility: HOSPITAL | Age: 25
End: 2025-03-17
Payer: COMMERCIAL

## 2025-03-17 ENCOUNTER — HOSPITAL ENCOUNTER (OUTPATIENT)
Facility: HOSPITAL | Age: 25
Setting detail: HOSPITAL OUTPATIENT SURGERY
Discharge: HOME | End: 2025-03-17
Attending: STUDENT IN AN ORGANIZED HEALTH CARE EDUCATION/TRAINING PROGRAM | Admitting: STUDENT IN AN ORGANIZED HEALTH CARE EDUCATION/TRAINING PROGRAM
Payer: COMMERCIAL

## 2025-03-17 ENCOUNTER — ANESTHESIA EVENT (OUTPATIENT)
Dept: SURGERY | Facility: HOSPITAL | Age: 25
End: 2025-03-17
Payer: COMMERCIAL

## 2025-03-17 VITALS
HEART RATE: 66 BPM | OXYGEN SATURATION: 100 % | WEIGHT: 119 LBS | DIASTOLIC BLOOD PRESSURE: 68 MMHG | HEIGHT: 64 IN | RESPIRATION RATE: 16 BRPM | BODY MASS INDEX: 20.32 KG/M2 | TEMPERATURE: 98 F | SYSTOLIC BLOOD PRESSURE: 114 MMHG

## 2025-03-17 DIAGNOSIS — E04.1 THYROID NODULE: ICD-10-CM

## 2025-03-17 DIAGNOSIS — E05.00 GRAVES DISEASE: Primary | ICD-10-CM

## 2025-03-17 LAB
B-HCG UR QL: NEGATIVE
CALCIUM BLD-MCNC: 8.7 MG/DL (ref 8.7–10.4)
PTH-INTACT SERPL-MCNC: 29.1 PG/ML (ref 18.5–88)

## 2025-03-17 PROCEDURE — 60240 REMOVAL OF THYROID: CPT | Performed by: STUDENT IN AN ORGANIZED HEALTH CARE EDUCATION/TRAINING PROGRAM

## 2025-03-17 PROCEDURE — 0GTK0ZZ RESECTION OF THYROID GLAND, OPEN APPROACH: ICD-10-PCS | Performed by: STUDENT IN AN ORGANIZED HEALTH CARE EDUCATION/TRAINING PROGRAM

## 2025-03-17 PROCEDURE — 60240 REMOVAL OF THYROID: CPT | Performed by: OTOLARYNGOLOGY

## 2025-03-17 RX ORDER — MORPHINE SULFATE 10 MG/ML
6 INJECTION, SOLUTION INTRAMUSCULAR; INTRAVENOUS EVERY 10 MIN PRN
Status: DISCONTINUED | OUTPATIENT
Start: 2025-03-17 | End: 2025-03-17

## 2025-03-17 RX ORDER — PROCHLORPERAZINE EDISYLATE 5 MG/ML
5 INJECTION INTRAMUSCULAR; INTRAVENOUS EVERY 8 HOURS PRN
Status: DISCONTINUED | OUTPATIENT
Start: 2025-03-17 | End: 2025-03-17

## 2025-03-17 RX ORDER — HYDROMORPHONE HYDROCHLORIDE 1 MG/ML
0.4 INJECTION, SOLUTION INTRAMUSCULAR; INTRAVENOUS; SUBCUTANEOUS EVERY 5 MIN PRN
Status: DISCONTINUED | OUTPATIENT
Start: 2025-03-17 | End: 2025-03-17

## 2025-03-17 RX ORDER — LIDOCAINE HYDROCHLORIDE 40 MG/ML
SOLUTION TOPICAL AS NEEDED
Status: DISCONTINUED | OUTPATIENT
Start: 2025-03-17 | End: 2025-03-17 | Stop reason: SURG

## 2025-03-17 RX ORDER — NALOXONE HYDROCHLORIDE 0.4 MG/ML
0.08 INJECTION, SOLUTION INTRAMUSCULAR; INTRAVENOUS; SUBCUTANEOUS AS NEEDED
Status: DISCONTINUED | OUTPATIENT
Start: 2025-03-17 | End: 2025-03-17

## 2025-03-17 RX ORDER — ROCURONIUM BROMIDE 10 MG/ML
INJECTION, SOLUTION INTRAVENOUS AS NEEDED
Status: DISCONTINUED | OUTPATIENT
Start: 2025-03-17 | End: 2025-03-17 | Stop reason: SURG

## 2025-03-17 RX ORDER — ONDANSETRON 2 MG/ML
INJECTION INTRAMUSCULAR; INTRAVENOUS AS NEEDED
Status: DISCONTINUED | OUTPATIENT
Start: 2025-03-17 | End: 2025-03-17 | Stop reason: SURG

## 2025-03-17 RX ORDER — ACETAMINOPHEN AND CODEINE PHOSPHATE 300; 30 MG/1; MG/1
1 TABLET ORAL ONCE
Status: COMPLETED | OUTPATIENT
Start: 2025-03-17 | End: 2025-03-17

## 2025-03-17 RX ORDER — LIDOCAINE HYDROCHLORIDE AND EPINEPHRINE 10; 10 MG/ML; UG/ML
INJECTION, SOLUTION INFILTRATION; PERINEURAL AS NEEDED
Status: DISCONTINUED | OUTPATIENT
Start: 2025-03-17 | End: 2025-03-17 | Stop reason: HOSPADM

## 2025-03-17 RX ORDER — SODIUM CHLORIDE, SODIUM LACTATE, POTASSIUM CHLORIDE, CALCIUM CHLORIDE 600; 310; 30; 20 MG/100ML; MG/100ML; MG/100ML; MG/100ML
INJECTION, SOLUTION INTRAVENOUS CONTINUOUS
Status: DISCONTINUED | OUTPATIENT
Start: 2025-03-17 | End: 2025-03-17

## 2025-03-17 RX ORDER — MIDAZOLAM HYDROCHLORIDE 1 MG/ML
INJECTION INTRAMUSCULAR; INTRAVENOUS AS NEEDED
Status: DISCONTINUED | OUTPATIENT
Start: 2025-03-17 | End: 2025-03-17 | Stop reason: SURG

## 2025-03-17 RX ORDER — MORPHINE SULFATE 4 MG/ML
4 INJECTION, SOLUTION INTRAMUSCULAR; INTRAVENOUS EVERY 10 MIN PRN
Status: DISCONTINUED | OUTPATIENT
Start: 2025-03-17 | End: 2025-03-17

## 2025-03-17 RX ORDER — HYDROMORPHONE HYDROCHLORIDE 1 MG/ML
0.6 INJECTION, SOLUTION INTRAMUSCULAR; INTRAVENOUS; SUBCUTANEOUS EVERY 5 MIN PRN
Status: DISCONTINUED | OUTPATIENT
Start: 2025-03-17 | End: 2025-03-17

## 2025-03-17 RX ORDER — ACETAMINOPHEN 500 MG
1000 TABLET ORAL ONCE
Status: COMPLETED | OUTPATIENT
Start: 2025-03-17 | End: 2025-03-17

## 2025-03-17 RX ORDER — MORPHINE SULFATE 4 MG/ML
2 INJECTION, SOLUTION INTRAMUSCULAR; INTRAVENOUS EVERY 10 MIN PRN
Status: DISCONTINUED | OUTPATIENT
Start: 2025-03-17 | End: 2025-03-17

## 2025-03-17 RX ORDER — SODIUM CHLORIDE, SODIUM LACTATE, POTASSIUM CHLORIDE, CALCIUM CHLORIDE 600; 310; 30; 20 MG/100ML; MG/100ML; MG/100ML; MG/100ML
INJECTION, SOLUTION INTRAVENOUS CONTINUOUS
Status: CANCELLED | OUTPATIENT
Start: 2025-03-17

## 2025-03-17 RX ORDER — LIDOCAINE HYDROCHLORIDE 10 MG/ML
INJECTION, SOLUTION EPIDURAL; INFILTRATION; INTRACAUDAL; PERINEURAL AS NEEDED
Status: DISCONTINUED | OUTPATIENT
Start: 2025-03-17 | End: 2025-03-17 | Stop reason: SURG

## 2025-03-17 RX ORDER — ACETAMINOPHEN AND CODEINE PHOSPHATE 300; 30 MG/1; MG/1
1 TABLET ORAL EVERY 4 HOURS PRN
Qty: 30 TABLET | Refills: 0 | Status: SHIPPED | OUTPATIENT
Start: 2025-03-17

## 2025-03-17 RX ORDER — ONDANSETRON 2 MG/ML
4 INJECTION INTRAMUSCULAR; INTRAVENOUS EVERY 6 HOURS PRN
Status: DISCONTINUED | OUTPATIENT
Start: 2025-03-17 | End: 2025-03-17

## 2025-03-17 RX ORDER — HYDROMORPHONE HYDROCHLORIDE 1 MG/ML
0.2 INJECTION, SOLUTION INTRAMUSCULAR; INTRAVENOUS; SUBCUTANEOUS EVERY 5 MIN PRN
Status: DISCONTINUED | OUTPATIENT
Start: 2025-03-17 | End: 2025-03-17

## 2025-03-17 RX ORDER — DEXAMETHASONE SODIUM PHOSPHATE 4 MG/ML
VIAL (ML) INJECTION AS NEEDED
Status: DISCONTINUED | OUTPATIENT
Start: 2025-03-17 | End: 2025-03-17 | Stop reason: SURG

## 2025-03-17 RX ADMIN — SODIUM CHLORIDE, SODIUM LACTATE, POTASSIUM CHLORIDE, CALCIUM CHLORIDE: 600; 310; 30; 20 INJECTION, SOLUTION INTRAVENOUS at 13:25:00

## 2025-03-17 RX ADMIN — DEXAMETHASONE SODIUM PHOSPHATE 8 MG: 4 MG/ML VIAL (ML) INJECTION at 12:34:00

## 2025-03-17 RX ADMIN — MIDAZOLAM HYDROCHLORIDE 2 MG: 1 INJECTION INTRAMUSCULAR; INTRAVENOUS at 12:26:00

## 2025-03-17 RX ADMIN — ONDANSETRON 4 MG: 2 INJECTION INTRAMUSCULAR; INTRAVENOUS at 12:34:00

## 2025-03-17 RX ADMIN — SODIUM CHLORIDE, SODIUM LACTATE, POTASSIUM CHLORIDE, CALCIUM CHLORIDE: 600; 310; 30; 20 INJECTION, SOLUTION INTRAVENOUS at 12:26:00

## 2025-03-17 RX ADMIN — LIDOCAINE HYDROCHLORIDE 50 MG: 10 INJECTION, SOLUTION EPIDURAL; INFILTRATION; INTRACAUDAL; PERINEURAL at 12:28:00

## 2025-03-17 RX ADMIN — ROCURONIUM BROMIDE 50 MG: 10 INJECTION, SOLUTION INTRAVENOUS at 12:28:00

## 2025-03-17 RX ADMIN — ROCURONIUM BROMIDE 20 MG: 10 INJECTION, SOLUTION INTRAVENOUS at 13:25:00

## 2025-03-17 RX ADMIN — LIDOCAINE HYDROCHLORIDE 4 ML: 40 SOLUTION TOPICAL at 12:31:00

## 2025-03-17 NOTE — ANESTHESIA PROCEDURE NOTES
Airway  Date/Time: 3/17/2025 12:31 PM  Urgency: Elective    Airway not difficult    General Information and Staff    Patient location during procedure: OR  Anesthesiologist: Jose M Palacios MD  Resident/CRNA: Chela Dominguez CRNA  Performed: CRNA   Performed by: Chela Dominguez CRNA  Authorized by: Jose M Palacios MD      Indications and Patient Condition  Indications for airway management: anesthesia  Sedation level: deep  Preoxygenated: yes  Patient position: sniffing  Mask difficulty assessment: 1 - vent by mask    Final Airway Details  Final airway type: endotracheal airway      Successful airway: ETT  Cuffed: yes   Successful intubation technique: Video laryngoscopy  Endotracheal tube insertion site: oral  Blade: Toan  Blade size: #3  ETT size (mm): 7.0    Cormack-Lehane Classification: grade I - full view of glottis  Placement verified by: capnometry   Cuff volume (mL): 8  Measured from: lips  ETT to lips (cm): 20  Number of attempts at approach: 1  Number of other approaches attempted: 0

## 2025-03-17 NOTE — ANESTHESIA POSTPROCEDURE EVALUATION
Patient: Jania Tapia    Procedure Summary       Date: 03/17/25 Room / Location: [facility] MAIN OR 02 / [facility] MAIN OR    Anesthesia Start: 1225 Anesthesia Stop:     Procedure: Total thyroidectomy (Neck) Diagnosis:       Thyroid nodule      (Thyroid nodule [E04.1])    Surgeons: Reynaldo Rizvi DO Anesthesiologist: Jose M Palacios MD    Anesthesia Type: general ASA Status: 1            Anesthesia Type: general    Vitals Value Taken Time   /75 03/17/25 1353   Temp 97.8 °F (36.6 °C) 03/17/25 1353   Pulse 89 03/17/25 1354   Resp 20 03/17/25 1354   SpO2 97 % 03/17/25 1354   Vitals shown include unfiled device data.    EM AN Post Evaluation:   Patient Evaluated in PACU  Patient Participation: complete - patient participated  Level of Consciousness: sleepy but conscious  Pain Score: 0  Pain Management: adequate  Airway Patency:patent  Dental exam unchanged from preop  Yes    Cardiovascular Status: stable and acceptable  Respiratory Status: acceptable and room air  Postoperative Hydration acceptable      MINOR KENDALL CRNA  3/17/2025 1:54 PM

## 2025-03-17 NOTE — H&P
[place]  OTOLARYNGOLOGY - HEAD & NECK SURGERY    1/31/2025     Reason for Consultation:   Graves' hyperthyroidism    History of Present Illness:   Patient is a pleasant 24 year old female who is being seen for history of Graves' hyperthyroidism.  The patient was tried on medical therapy with methimazole and ended up having an allergic reaction.  She has recently switched to PTU.  She is currently being managed by Dr. Alcantara.  She was referred to me for possible surgical consultation.  The patient has not had any previous neck surgery.  She does have a history of Graves' disease in her family.  She is not currently thinking about pregnancy but states that it is a possibility in the next few years.  She did have a thyroid ultrasound which showed just heterogeneity of the thyroid gland without any distinct nodules.    Past Medical History  Past Medical History:    Disorder of thyroid    Visual impairment    glasses       Past Surgical History  Past Surgical History:   Procedure Laterality Date    [place] teeth removed         Family History  History reviewed. No pertinent family history.    Social History  Pediatric History   Patient Parents    Vianey Khan (Mother)     Other Topics Concern    Not on file   Social History Narrative    Not on file           Current Medications:  No current outpatient medications on file.       Allergies  Allergies[1]    Review of Systems:   A comprehensive 10 point review of systems was completed.  Pertinent positives and negatives noted in the the HPI.    Physical Exam:   Blood pressure 109/70, pulse 62, temperature 98.2 °F (36.8 °C), temperature source Oral, resp. rate 16, height 5' 4\" (1.626 m), weight 119 lb (54 kg), last menstrual period 03/01/2025, SpO2 99%.    GENERAL: No acute distress, Comfortable appearing  FACE: HB 1/6, Normal Animation  HEAD: Normocephalic  EYES: EOMI, pupils equil  EARS: Bilateral Auricles Symmetric  NOSE: Nares patent bilaterally  ORAL CAVITY: Tongue  mobile, Oropharynx clear, Floor of mouth clear, Posterior oropharynx normal  NECK: No palpable lymphadenopathy, thyroid palpable but small, nontender    Results:     Laboratory Data:  Lab Results   Component Value Date    T4F 1.5 02/10/2025         Imaging:  PROCEDURE: US THYROID     HISTORY: Hypothyroidism.     TECHNIQUE: Ultrasound of the thyroid gland was performed.     COMPARISON: None.     FINDINGS:     MEASUREMENTS:     Right lobe: 4.6 x 1.5 x 1.6 cm (CC x AP x WIDTH)   Left lobe:  4.8 x 1.6 x 1.6 cm (CC x AP x WIDTH)   Isthmus: 0.2 cm in thickness     Echogenicity: The thyroid gland is diffusely heterogenous in echogenicity.     Vascularity: The thyroid gland has normal vascularity.     Nodules (Per ACR guidelines, up to four nodules described): None.       IMPRESSION:     Diffusely heterogeneous thyroid without discrete nodule identified. Findings can be seen with thyroiditis.     FINAL REPORT   Attending Radiologist:  Blaze Vyas MD   Date Signed Off:  12/11/2024 09:24       Impression:       ICD-10-CM    1. Graves disease  E05.00            Recommendations:  I did have an in-depth discussion with the patient regarding thyroid surgery.  We did discuss some of the risks of thyroid surgery including temporary or persistent hypocalcemia, temporary or persistent hoarseness due to injury to the recurrent laryngeal nerves.  We also discussed some bleeding, infection, hematoma or seroma, scarring, need for further surgery.  We did however also discussed the benefits of surgery which would include being able to avoid medications like PTU and methimazole.  We did discuss that treatment of hypothyroidism postsurgically is much easier to manage.  She will give this some thought and I will provide her with a handout via [employer] regarding thyroidectomy.  She will reach out to me if she wishes to pursue surgery.    3/17/2025: I again discussed risks of surgery with the patient and answered all questions and concerns. We  will see how she feels post op and will check PTH and Calcium post op to decide on discharge today vs observation overnight    Reynaldo Rizvi,    Otolaryngology/Rhinology, Sinus, and Endoscopic Skull Base Surgery  [address] [place] [address]  Phone [phone]  Fax [phone]  1/21/2025  8:09 PM  1/31/2025          [1]   Allergies  Allergen Reactions    Methimazole [Thiamazole] HIVES

## 2025-03-17 NOTE — ANESTHESIA PREPROCEDURE EVALUATION
Anesthesia PreOp Note    HPI:     Jania Tapia is a 24 year old female who presents for preoperative consultation requested by: Reynaldo Rizvi DO    Date of Surgery: 3/17/2025    Procedure(s):  Total thyroidectomy  Indication: Thyroid nodule [E04.1]    Relevant Problems   No relevant active problems       NPO:  Last Liquid Consumption Date: 03/16/25  Last Liquid Consumption Time: 2230  Last Solid Consumption Date: 03/16/25  Last Solid Consumption Time: 2100  Last Liquid Consumption Date: 03/16/25          History Review:  There are no active problems to display for this patient.      Past Medical History:    Disorder of thyroid    Visual impairment    glasses       Past Surgical History:   Procedure Laterality Date    [place] teeth removed         Prescriptions Prior to Admission[1]  Current Medications and Prescriptions Ordered in Epic[2]    Allergies[3]    History reviewed. No pertinent family history.  Social History     Socioeconomic History    Marital status: Single   Tobacco Use    Smoking status: Never    Smokeless tobacco: Never   Vaping Use    Vaping status: Never Used   Substance and Sexual Activity    Alcohol use: Yes     Comment: monthly    Drug use: Never       Available pre-op labs reviewed.  Lab Results   Component Value Date    URINEPREG Negative 03/17/2025             Vital Signs:  Body mass index is 20.43 kg/m².   height is 1.626 m (5' 4\") and weight is 54 kg (119 lb). Her oral temperature is 98.2 °F (36.8 °C). Her blood pressure is 109/70 and her pulse is 62. Her respiration is 16 and oxygen saturation is 99%.   Vitals:    03/10/25 1238 03/17/25 0957 03/17/25 1000   BP:  109/70    Pulse:  62    Resp:  16    Temp:  98.2 °F (36.8 °C)    TempSrc:  Oral    SpO2:  99%    Weight: 54 kg (119 lb)  54 kg (119 lb)   Height: 1.626 m (5' 4\")          Anesthesia Evaluation     Patient summary reviewed and Nursing notes reviewed    No history of anesthetic complications   Airway   Mallampati: I  TM distance: >3  FB  Neck ROM: full  Dental - Dentition appears grossly intact     Pulmonary - negative ROS and normal exam   Cardiovascular - negative ROS and normal exam  Exercise tolerance: good    Neuro/Psych - negative ROS     GI/Hepatic/Renal - negative ROS     Endo/Other - negative ROS   Abdominal  - normal exam                 Anesthesia Plan:   ASA:  1  Plan:   General  Airway:  ETT  Post-op Pain Management: IV analgesics and Oral pain medication  Informed Consent Plan and Risks Discussed With:  Patient  Discussed plan with:  CRNA      I have informed Jania Tapia and/or legal guardian or family member of the nature of the anesthetic plan, benefits, risks including possible dental damage if relevant, major complications, and any alternative forms of anesthetic management.   All of the patient's questions were answered to the best of my ability. The patient desires the anesthetic management as planned.  ABHIJIT CROSS MD  3/17/2025 10:16 AM  Present on Admission:  **None**           [1]   Medications Prior to Admission   Medication Sig Dispense Refill Last Dose/Taking    Ergocalciferol (VITAMIN D OR) Take 1 tablet by mouth daily.   Past Week    Ferrous Sulfate 324 (65 Fe) MG Oral Tab EC Take 1 tablet by mouth as needed.   Past Week    propylthiouracil 50 MG Oral Tab Take 1 tablet (50 mg total) by mouth in the morning and 1 tablet (50 mg total) before bedtime. 60 tablet 0 3/17/2025 at  8:00 AM    levothyroxine 75 MCG Oral Tab Take 1 tablet (75 mcg total) by mouth before breakfast. 90 tablet 1 Unknown   [2]   Current Facility-Administered Medications Ordered in Epic   Medication Dose Route Frequency Provider Last Rate Last Admin    lactated ringers infusion   Intravenous Continuous Reynaldo Rizvi DO 20 mL/hr at 03/17/25 1013 New Bag at 03/17/25 1013     No current [facility]-ordered outpatient medications on file.   [3]   Allergies  Allergen Reactions    Methimazole [Thiamazole] HIVES

## 2025-03-18 ENCOUNTER — TELEPHONE (OUTPATIENT)
Dept: OTOLARYNGOLOGY | Facility: CLINIC | Age: 25
End: 2025-03-18

## 2025-03-18 ENCOUNTER — PATIENT MESSAGE (OUTPATIENT)
Facility: LOCATION | Age: 25
End: 2025-03-18

## 2025-03-18 NOTE — TELEPHONE ENCOUNTER
Per Dr. Rizvi, is okay to drive as long as patient is not taking narcotics and can turn her head to check her blind spot while driving.  Patient was instructed  to not drive while on narcotics, and she should be able to turn head when called earlier.  [employer] message also sent

## 2025-03-18 NOTE — TELEPHONE ENCOUNTER
Post op day #1 total thyroidectomy    Per patient, is eating soft diet, is drinking fluids well,  Pain is well controlled with tylenol #3, has started taking calcium carbonate-vitamin D as directed, no bleeding, has small amount of old drainage slightly under incision underneath dressing, dressing to neck is clean and dry, aware she can only shower below chest, to not get dressing wet, no heavy lifting over a gallon of milk, no strenuous activity or exercise until cleared by MD, no driving while taking narcotics, is resting or sleeping with head elevated, no fever. Has no drain, follow up appt made. Reinforced post op instructions as follows    After the surgery It may take a few hours for the anesthesia to wear off. You'll get up and walk around soon after the surgery. You'll be watched for bleeding. You may spend some time staying in the hospital or surgery center after the surgery. In most cases, you can eat and drink the evening after the surgery. You may still have an upset stomach (nausea) from the anesthesia. You will be tested to make sure your parathyroid glands are working. The stress of surgery may stop them from working for a short time. If this happens, you may be given calcium and vitamin D pills for a few weeks You may have a sore throat and a hoarse voice for 1 week or so after the surgery You will have stitches that will need to be removed 8 days after surgery. Risks and possible complications. The risks and possible complications of this procedure include: Bleeding Infection Damage to nerves in your voice box. This can lead to a hoarse voice. Often this hoarseness gets better over time. But in rare cases it may last. Damage to the parathyroid glands or their blood supply. This can make them underactive (hypoparathyroidism). These glands control the amount of calcium in your blood. Often the hypoparathyroidism gets better over time. But you may need to take daily calcium pills for a long time, or  for the rest of your life. You may also need to take vitamin D supplements. Damage to the food pipe or airway, which is very rare. Verbalized understanding. No further questions.  To call office for fever over 101 F, bleeding, unusual drainage such as yellow or green from incision, uncontrolled pain, increased swelling or redness or any concerning symptoms.  Future Appointments   Date Time Provider Department Center   3/25/2025  9:45 AM Reynaldo Rizvi DO [facility]   5/29/2025  8:30 AM Shakeel Alcantara MD [facility]

## 2025-03-18 NOTE — OPERATIVE REPORT
OTOLARYNGOLOGY/HEAD & NECK SURGERY  OPERATIVE REPORT    Patient Name: Jania Tapia     Date of Surgery: 3/17/2025      Attending Surgeon: Reynaldo Rizvi DO     Assistant Surgeon: Clayton Ott MD    Anesthesia type: General     PREOPERATIVE DIAGNOSIS:   Graves hyperthyroidism    POSTOPERATIVE DIAGNOSIS:   Same    OPERATION:   Total Thyroidectomy    OPERATIVE FINDINGS:   Vascular thyroid goiter  Normal parathyroid glands    DESCRIPTION OF PROCEDURE:     The patient was brought to the operating room after all risks and benefits of the procedure were explained and consent was obtained. The patient was positively identified by the attending surgeon and was brought into the Operating Room and placed in the supine position. After induction of general anesthesia, the patient was orotracheally intubated and the tube was secured on the left side. All pressure points were carefully padded and a foam donut was placed underneath the head, and a shoulder roll placed underneath the shoulders.  The eyes were protected with tape.  A curvilinear incision was marked in the anterior neck in a skin crease, and injected using 1% lidocaine with 1-100,000 epinephrine.  The patient was drapped in the typical fashion for neck surgery A time-out was then performed.    Using a 15 blade scalpel incision was made through the skin and dermal layer.  Dissection was then carried down to the platysma which was  sharply incised.  Subplatysmal flaps were then elevated using monopolar cautery.  The midline raphae was then identified and bisected in the midline using monopolar cautery.  Dissection was carried down onto the top of the thyroid gland.  Once this was done dissection was carried laterally to the left side.  The fascia was dissected off of the thyroid gland and the strap musculature was lifted off of the thyroid gland.  Once this was done we exposed the inferior pole and carried dissection superiorly.    We did identify the inferior parathyroid  gland on the side and carefully dissected away from the thyroid gland.  Dissection was carried towards the superior pole and the superior pole parathyroid gland was also identified and spared.  The superior pole was then isolated as well as the superior pole vessels and these were cauterized using bipolar cautery.  Once these were released we were able to rotate the gland medially.  The recurrent laryngeal nerve was identified and spared.  The remainder of the thyroid lobe was then dissected off of the underlying soft tissue and then off of the trachea.  Dissection was then carried onto the pyramidal lobe which was removed from the cricothyroid muscle using monopolar cautery.  We then turned our attention to the right hemithyroid which was removed in the same fashion.  Both parathyroid glands and the recurrent laryngeal nerve were identified on the side and spared.  The thyroid gland was then removed off of the anterior trachea.  This was sent for permanent pathologic analysis.     We then began closing the incision.  The wound bed was irrigated and a Valsalva was performed.  There is no notable bleeding.  Surgicel was placed into the wound bed.  The strap musculature was closed using a 3-0 chromic suture placed in the running nonlocking fashion.  The platysma and deep dermal layer was closed using 3-0 chromic suture placed in a simple interrupted buried fashion.  The skin was then closed using 4-0 Prolene suture placed in the running subcuticular fashion.  Steri-Strips were then applied.  A Tegaderm was then applied.    The patient was then rotated back to the Anesthesia Team and was awakened and extubated having tolerated the procedure well, and then transferred to the PACU in stable condition.     Specimens: Total thyroid     Complications: None     Estimated Blood Loss: 20 mL     Disposition: PACU     Condition: Stable

## 2025-03-19 NOTE — TELEPHONE ENCOUNTER
Jania Frank says she has \"white stuff\" under her surgical tape, I asked her to send us a mychart picture for you to review.  She's also concerned about infection and wants to know if she can have an oral ABX.

## 2025-03-20 RX ORDER — CEPHALEXIN 500 MG/1
500 CAPSULE ORAL EVERY 8 HOURS
Qty: 21 CAPSULE | Refills: 0 | Status: SHIPPED | OUTPATIENT
Start: 2025-03-20

## 2025-03-20 RX ORDER — CEPHALEXIN 500 MG/1
500 CAPSULE ORAL EVERY 8 HOURS
Qty: 21 CAPSULE | Refills: 0 | Status: SHIPPED | OUTPATIENT
Start: 2025-03-20 | End: 2025-03-20

## 2025-03-20 NOTE — TELEPHONE ENCOUNTER
Called and spoke to patient, informed her Dr. Rizvi has ordered Cephalexin. Instructed patient on dosing. Patient request the prescription be sent to the [facility] in [facility], because [facility] takes her insurance. Patient understanding to all information.

## 2025-03-20 NOTE — TELEPHONE ENCOUNTER
Yeah we can do that, I sent you some cephalexin just in case you start developing signs of infection

## 2025-03-25 ENCOUNTER — OFFICE VISIT (OUTPATIENT)
Facility: LOCATION | Age: 25
End: 2025-03-25
Payer: COMMERCIAL

## 2025-03-25 VITALS — WEIGHT: 119 LBS | BODY MASS INDEX: 20.32 KG/M2 | HEIGHT: 64 IN

## 2025-03-25 DIAGNOSIS — E05.00 GRAVES DISEASE: Primary | ICD-10-CM

## 2025-03-25 PROCEDURE — 99024 POSTOP FOLLOW-UP VISIT: CPT | Performed by: STUDENT IN AN ORGANIZED HEALTH CARE EDUCATION/TRAINING PROGRAM

## 2025-03-25 NOTE — PROGRESS NOTES
[facility]  OTOLARYNGOLOGY - HEAD & NECK SURGERY    3/25/2025     Reason for Consultation:   Graves' hyperthyroidism    History of Present Illness:   Patient is a pleasant 24 year old female who is being seen for history of Graves' hyperthyroidism.  The patient was tried on medical therapy with methimazole and ended up having an allergic reaction.  She has recently switched to PTU.  She is currently being managed by Dr. Alcantara.  She was referred to me for possible surgical consultation.  The patient has not had any previous neck surgery.  She does have a history of Graves' disease in her family.  She is not currently thinking about pregnancy but states that it is a possibility in the next few years.  She did have a thyroid ultrasound which showed just heterogeneity of the thyroid gland without any distinct nodules.    INTERVAL HISTORY  3/25/2025: The patient is 1 week postop from total thyroidectomy.  She is doing quite well.  Voice appears to be normal, minimal pain.    Past Medical History  Past Medical History:    Disorder of thyroid    Visual impairment    glasses       Past Surgical History  Past Surgical History:   Procedure Laterality Date    Thyroidectomy Bilateral 03/17/2025    total thyroidectomy    [place] teeth removed         Family History  History reviewed. No pertinent family history.    Social History  Pediatric History   Patient Parents    Vianey Khan (Mother)     Other Topics Concern    Not on file   Social History Narrative    Not on file           Current Medications:  Current Outpatient Medications   Medication Sig Dispense Refill    cephALEXin 500 MG Oral Cap Take 1 capsule (500 mg total) by mouth every 8 (eight) hours. 21 capsule 0    calcium carbonate-vitamin D 250-3.125 MG-MCG Oral Tab Take 4 tablets by mouth 3 (three) times daily for 14 days. 168 tablet 0    acetaminophen-codeine 300-30 MG Oral Tab Take 1 tablet by mouth every 4 (four) hours as needed for Pain. 30 tablet 0    Ergocalciferol  (VITAMIN D OR) Take 1 tablet by mouth daily.      Ferrous Sulfate 324 (65 Fe) MG Oral Tab EC Take 1 tablet by mouth as needed.      levothyroxine 75 MCG Oral Tab Take 1 tablet (75 mcg total) by mouth before breakfast. 90 tablet 1    cephALEXin 500 MG Oral Cap Take 1 capsule (500 mg total) by mouth every 8 (eight) hours. 21 capsule 0    propylthiouracil 50 MG Oral Tab Take 1 tablet (50 mg total) by mouth in the morning and 1 tablet (50 mg total) before bedtime. 60 tablet 0       Allergies  Allergies[1]    Review of Systems:   A comprehensive 10 point review of systems was completed.  Pertinent positives and negatives noted in the the HPI.    Physical Exam:   Height 5' 4\" (1.626 m), weight 119 lb (54 kg), last menstrual period 03/01/2025.    GENERAL: No acute distress, Comfortable appearing  FACE: HB 1/6, Normal Animation  HEAD: Normocephalic  EYES: EOMI, pupils equil  EARS: Bilateral Auricles Symmetric  NOSE: Nares patent bilaterally  ORAL CAVITY: Tongue mobile, Oropharynx clear, Floor of mouth clear, Posterior oropharynx normal  NECK: No palpable lymphadenopathy, anterior neck incision appears to be clean and dry, healing well    Results:     Laboratory Data:  Lab Results   Component Value Date    CA 8.7 03/17/2025    T4F 1.5 02/10/2025         Imaging:  PROCEDURE: US THYROID     HISTORY: Hypothyroidism.     TECHNIQUE: Ultrasound of the thyroid gland was performed.     COMPARISON: None.     FINDINGS:     MEASUREMENTS:     Right lobe: 4.6 x 1.5 x 1.6 cm (CC x AP x WIDTH)   Left lobe:  4.8 x 1.6 x 1.6 cm (CC x AP x WIDTH)   Isthmus: 0.2 cm in thickness     Echogenicity: The thyroid gland is diffusely heterogenous in echogenicity.     Vascularity: The thyroid gland has normal vascularity.     Nodules (Per ACR guidelines, up to four nodules described): None.       IMPRESSION:     Diffusely heterogeneous thyroid without discrete nodule identified. Findings can be seen with thyroiditis.     FINAL REPORT   Attending  Radiologist:  Blaze Vyas MD   Date Signed Off:  12/11/2024 09:24       Impression:       ICD-10-CM    1. Graves disease  E05.00            Recommendations:  The patient is doing well 1 week postop from total thyroidectomy.  Dressing was removed today and appears to be healing well.  She will follow-up with Dr. Quinton Rizvi, DO   Otolaryngology/Rhinology, Sinus, and Endoscopic Skull Base Surgery  [address] [place] [address]  Phone [phone]  Fax [phone]  1/21/2025  8:09 PM  3/25/2025          [1]   Allergies  Allergen Reactions    Methimazole [Thiamazole] HIVES

## 2025-05-27 ENCOUNTER — LAB ENCOUNTER (OUTPATIENT)
Dept: LAB | Facility: HOSPITAL | Age: 25
End: 2025-05-27
Attending: INTERNAL MEDICINE
Payer: COMMERCIAL

## 2025-05-27 DIAGNOSIS — E07.9 THYROID DISEASE: ICD-10-CM

## 2025-05-27 LAB
T4 FREE SERPL-MCNC: 1.1 NG/DL (ref 0.8–1.7)
TSI SER-ACNC: 8.19 UIU/ML (ref 0.55–4.78)

## 2025-05-27 PROCEDURE — 36415 COLL VENOUS BLD VENIPUNCTURE: CPT

## 2025-05-27 PROCEDURE — 84439 ASSAY OF FREE THYROXINE: CPT

## 2025-05-27 PROCEDURE — 84443 ASSAY THYROID STIM HORMONE: CPT

## 2025-05-29 ENCOUNTER — OFFICE VISIT (OUTPATIENT)
Facility: LOCATION | Age: 25
End: 2025-05-29
Payer: COMMERCIAL

## 2025-05-29 VITALS
BODY MASS INDEX: 20.66 KG/M2 | WEIGHT: 121 LBS | SYSTOLIC BLOOD PRESSURE: 90 MMHG | HEIGHT: 64 IN | DIASTOLIC BLOOD PRESSURE: 50 MMHG | HEART RATE: 50 BPM

## 2025-05-29 DIAGNOSIS — E89.0 POSTOPERATIVE HYPOTHYROIDISM: ICD-10-CM

## 2025-05-29 DIAGNOSIS — E07.9 THYROID DISEASE: Primary | ICD-10-CM

## 2025-05-29 DIAGNOSIS — E05.00 GRAVES DISEASE: ICD-10-CM

## 2025-05-29 PROCEDURE — 99214 OFFICE O/P EST MOD 30 MIN: CPT | Performed by: INTERNAL MEDICINE

## 2025-05-29 RX ORDER — LEVOTHYROXINE SODIUM 88 UG/1
88 TABLET ORAL
Qty: 90 TABLET | Refills: 1 | Status: SHIPPED | OUTPATIENT
Start: 2025-05-29 | End: 2025-11-25

## 2025-05-29 NOTE — PROGRESS NOTES
Reason for Visit:  post op hypothyroidism, Graves   Requesting Physician: Elana Dubin, PA ..No primary care provider on file.  No referring provider defined for this encounter.    CHIEF COMPLAINT:    Chief Complaint   Patient presents with    Graves     F/u        HISTORY OF PRESENT ILLNESS:   Jania Tapia is a 24 year old female who presents with    post op hypothyroidism 3/17/2025 and Graves     Post op she started LT4 75 mcg/day   Patient's last menstrual period was 05/2025.      Has constipation, fatigue, acne, hair loss, bloating,  cold intolerance and decreased appetite   Recent labs showed TSH high   She had reaction to the adhesive maybe     She works at [employer] in [place].   Outside records showed high TSI and hyperthyroid.  She was Dx in 10/2024 w/ hyperthyroid on routine labs.  W/u showed TSI and thyroiditis on US.       She is vit D , probiotic,   M and MGF with Graves disease    Hair and skin: hives   Menstrual hx: Patient's last menstrual period was 03/01/2025. LMP 5/2025, regular , not on ocp  Neck surgery: No  Neck radiation: No   Biotin: no  Turmeric:no  Family history of thyroid cancer in 1st degree relatives: no    Wt Readings from Last 6 Encounters:   05/29/25 121 lb (54.9 kg)   03/25/25 119 lb (54 kg)   03/17/25 119 lb (54 kg)   02/25/25 119 lb (54 kg)   01/21/25 116 lb (52.6 kg)   01/07/25 116 lb (52.6 kg)        03/17/25 12:57   Specimen to Pathology Tissue Total thyroidectomy:   Thyroid with chronic lymphocytic thyroiditis  No discrete nodules or malignancy identified  One benign lymph node      ASSESSMENT AND PLAN:  Jania Tapia is a 24 year old female who presents with   post op hypothyroidism, Surgery 3/17 with Dr Cee for  Graves    Plan  Labs and RTC in 2 mo     Increase  levothyroxine 75 ->88 mcg/day   Thyroid medication dose levothyroxine   Take you medication on empty stomach, not with any other medication or food. Wait 60 minutes before eating. Wait 4 hours before taking  Vitamins, Calcium or iron.  Wait 60 minutes before eating. Do not take the medication on the morning of the lab test. Do not take Biotin/Turmeric 1 week before the test.    During pregnancy, we need to increase thyroid medication dose (take double your usual dose on 2 days a week, the rest of the days take the usual dose) and CALL US TO SCHEDULE AN APPT TO DO LABS AND ADJUST THE DOSE.          [email or web address]  [email or web address]://[email or web address]/thyroid-[email or web address]      Don’t take your thyroid hormone at the same time as:  Walnuts.  Soybean flour.  Cottonseed meal.  Iron supplements or multivitamins containing iron.  Calcium supplements.  Antacids that contain aluminum, magnesium or calcium.  Some ulcer medicines, such as sucralfate (Carafate).  Some cholesterol-lowering drugs, such as those containing cholestyramine (Prevalite, Locholest) and colestipol (Colestid).  To avoid possible problems, eat these foods or use these products several hours before or after you take your thyroid medicine.    Supplements containing biotin, common in hair and nail products, can make it hard to measure how much thyroid hormone is in the body. Biotin does not affect thyroid hormone levels. But supplements that have biotin should be stopped for at least a week before measuring thyroid function so that the measurement is correct.           PAST MEDICAL HISTORY:   Past Medical History:    Disorder of thyroid    Visual impairment    glasses     Graves, hyperthyroid   PAST SURGICAL HISTORY:   Past Surgical History:   Procedure Laterality Date    Thyroidectomy Bilateral 03/17/2025    total thyroidectomy    Switzer teeth removed       no  CURRENT MEDICATIONS:     levothyroxine 88 MCG Oral Tab Take 1 tablet (88 mcg total) by mouth before breakfast. 90 tablet 1       ALLERGIES:  Allergies[1]    SOCIAL HISTORY:    Social History     Socioeconomic History    Marital status: Single   Tobacco Use    Smoking status: Never     Smokeless tobacco: Never   Vaping Use    Vaping status: Never Used   Substance and Sexual Activity    Alcohol use: Yes     Comment: monthly    Drug use: Never     Works in the labs  Smoking no  Marijuana no  Etoh rare  Drugs no  Single, not planning pregnancy     FAMILY HISTORY:   History reviewed. No pertinent family history.  M and MGF with graves disease      PHYSICAL EXAM:   Height: 5' 4\" (162.6 cm) (05/29 0823)  Weight: 121 lb (54.9 kg) (05/29 0823)  BSA (Calculated - sq m): 1.58 sq meters (05/29 0823)  Pulse: 50 (05/29 0823)  BP: 90/50 (05/29 0823)  Temp: --  Do Not Use - Resp Rate: --  SpO2: --    Body mass index is 20.77 kg/m².   Well healed surgery wound      DATA:     Pertinent data reviewed  12/2024 US thyroid Diffusely heterogeneous thyroid without discrete nodule identified. Findings can be seen with thyroiditis      02/10/25 15:08   T4,Free (Direct)  1.5     12/4/24 10:29 AM    TSI - External  <140 % baseline 194 High      12/4/24 10:29 AM    TRAb Antibody RAJAT method  < OR = 2.00 IU/L 4.29       ABSOLUTE NEUTROPHILS - External  1500 - 7800 cells/uL 2,08     10/29/24  8:15 AM    TSH - External  mIU/L 0.02 Low        10/29/24  8:15 AM    VITAMIN D,25-OH,TOTAL,IA - External  30 - 100 ng/mL 68     10/29/24  8:15 AM    T4, FREE - External  0.8 - 1.8 ng/dL 3 High        3/28/24  9:04 AM    ALT  0 - 33 U/L 21       10/17/20  7:56 AM    TSH - External  mIU/L 1.63     3/28/24  9:04 AM    AST  0 - 32 U/L 23     No results found.      Recent Labs     05/27/25  0906   TSH 8.193*   T4F 1.1     TSH   Date Value Ref Range Status   05/27/2025 8.193 (H) 0.550 - 4.780 uIU/mL Final     No results found for: \"A1C\"        Recent Labs     05/27/25  0906   TSH 8.193*   T4F 1.1     No results found.    Orders Placed This Encounter   Procedures    TSH W Reflex To Free T4     Orders Placed This Encounter    TSH W Reflex To Free T4     Standing Status:   Future     Expected Date:   6/29/2025     Expiration Date:   5/29/2026      Release to patient:   Immediate    levothyroxine 88 MCG Oral Tab     Sig: Take 1 tablet (88 mcg total) by mouth before breakfast.     Dispense:  90 tablet     Refill:  1          This is a specialized patient consultation in endocrinology and required comprehensive review of prior records, as well as current evaluation, with time required for consideration of complex endocrine issues and consultation. For this visit, I personally interviewed the patient, and family member if accompanied, performed the pertinent parts of the history and physical examination. ROS included screening for appropriate endocrine conditions.   Today's diagnosis and plan were reviewed in detail with the patient who states understanding and agrees with plan. I discussed with the patient possible diagnosis, differential diagnosis, need for work up, treatment options, alternatives and side effects.     Please see note for details about time spent which includes:   · pre-visit preparation  · reviewing records  · face to face time with the patient   · timely documentation of the encounter  · ordering medications/tests  · communication with care team  · care coordination    I appreciate the opportunity to be part of your patient's medical care and will keep you, as the referring and primary physicians, informed about the care of your patient. Please feel free to contact me should you have any questions.    The 21st Century Cures Act makes medical notes like these available to patients in the interest of transparency. Please be advised this is a medical document. Medical documents are intended to carry relevant information, facts as evident, and the clinical opinion of the practitioner. The medical note is intended as peer to peer communication and may appear blunt or direct. It is written in medical language and may contain abbreviations or verbiage that are unfamiliar.   Shakeel Alcantara MD             [1]   Allergies  Allergen Reactions     Methimazole [Thiamazole] HIVES

## 2025-05-29 NOTE — PATIENT INSTRUCTIONS
Labs and RTC in 2 mo     Increase  levothyroxine 75 ->88 mcg/day   Thyroid medication dose levothyroxine   Take you medication on empty stomach, not with any other medication or food. Wait 60 minutes before eating. Wait 4 hours before taking Vitamins, Calcium or iron.  Wait 60 minutes before eating. Do not take the medication on the morning of the lab test. Do not take Biotin/Turmeric 1 week before the test.    During pregnancy, we need to increase thyroid medication dose (take double your usual dose on 2 days a week, the rest of the days take the usual dose) and CALL US TO SCHEDULE AN APPT TO DO LABS AND ADJUST THE DOSE.          [email or web address]-[email or web address]  https://[email or web address]/thyroid-information/      Don’t take your thyroid hormone at the same time as:  Walnuts.  Soybean flour.  Cottonseed meal.  Iron supplements or multivitamins containing iron.  Calcium supplements.  Antacids that contain aluminum, magnesium or calcium.  Some ulcer medicines, such as sucralfate (Carafate).  Some cholesterol-lowering drugs, such as those containing cholestyramine (Prevalite, Locholest) and colestipol (Colestid).  To avoid possible problems, eat these foods or use these products several hours before or after you take your thyroid medicine.    Supplements containing biotin, common in hair and nail products, can make it hard to measure how much thyroid hormone is in the body. Biotin does not affect thyroid hormone levels. But supplements that have biotin should be stopped for at least a week before measuring thyroid function so that the measurement is correct.

## 2025-07-29 ENCOUNTER — LAB ENCOUNTER (OUTPATIENT)
Dept: LAB | Facility: HOSPITAL | Age: 25
End: 2025-07-29
Attending: INTERNAL MEDICINE

## 2025-07-29 DIAGNOSIS — E07.9 THYROID DISEASE: ICD-10-CM

## 2025-07-29 LAB — TSI SER-ACNC: 1.78 UIU/ML (ref 0.55–4.78)

## 2025-07-29 PROCEDURE — 36415 COLL VENOUS BLD VENIPUNCTURE: CPT

## 2025-07-29 PROCEDURE — 84443 ASSAY THYROID STIM HORMONE: CPT

## 2025-07-31 ENCOUNTER — OFFICE VISIT (OUTPATIENT)
Facility: LOCATION | Age: 25
End: 2025-07-31
Payer: COMMERCIAL

## 2025-07-31 VITALS
DIASTOLIC BLOOD PRESSURE: 56 MMHG | HEIGHT: 64 IN | HEART RATE: 68 BPM | SYSTOLIC BLOOD PRESSURE: 90 MMHG | BODY MASS INDEX: 20.32 KG/M2 | WEIGHT: 119 LBS

## 2025-07-31 DIAGNOSIS — E07.9 THYROID DISEASE: Primary | ICD-10-CM

## 2025-07-31 DIAGNOSIS — E89.0 POSTOPERATIVE HYPOTHYROIDISM: ICD-10-CM

## 2025-07-31 DIAGNOSIS — E05.00 GRAVES DISEASE: ICD-10-CM

## 2025-07-31 DIAGNOSIS — L70.9 ACNE, UNSPECIFIED ACNE TYPE: ICD-10-CM

## 2025-07-31 PROCEDURE — 99214 OFFICE O/P EST MOD 30 MIN: CPT | Performed by: INTERNAL MEDICINE

## 2025-07-31 RX ORDER — LEVOTHYROXINE SODIUM 88 UG/1
88 TABLET ORAL
Qty: 90 TABLET | Refills: 3 | Status: SHIPPED | OUTPATIENT
Start: 2025-07-31

## 2025-08-01 ENCOUNTER — LAB ENCOUNTER (OUTPATIENT)
Dept: LAB | Age: 25
End: 2025-08-01
Attending: INTERNAL MEDICINE

## 2025-08-14 ENCOUNTER — OFFICE VISIT (OUTPATIENT)
Dept: FAMILY MEDICINE CLINIC | Facility: CLINIC | Age: 25
End: 2025-08-14

## 2025-08-14 ENCOUNTER — LAB ENCOUNTER (OUTPATIENT)
Dept: LAB | Age: 25
End: 2025-08-14
Attending: PHYSICIAN ASSISTANT

## 2025-08-14 VITALS
WEIGHT: 116 LBS | HEIGHT: 64 IN | DIASTOLIC BLOOD PRESSURE: 67 MMHG | HEART RATE: 69 BPM | BODY MASS INDEX: 19.81 KG/M2 | SYSTOLIC BLOOD PRESSURE: 102 MMHG

## 2025-08-14 DIAGNOSIS — Z00.00 WELLNESS EXAMINATION: Primary | ICD-10-CM

## 2025-08-14 DIAGNOSIS — N89.8 VAGINAL DISCHARGE: ICD-10-CM

## 2025-08-14 DIAGNOSIS — Z00.00 WELLNESS EXAMINATION: ICD-10-CM

## 2025-08-14 DIAGNOSIS — Z01.419 ENCOUNTER FOR ROUTINE GYNECOLOGICAL EXAMINATION WITH PAPANICOLAOU SMEAR OF CERVIX: ICD-10-CM

## 2025-08-14 LAB
ALBUMIN SERPL-MCNC: 4.9 G/DL (ref 3.2–4.8)
ALBUMIN/GLOB SERPL: 1.8 (ref 1–2)
ALP LIVER SERPL-CCNC: 59 U/L (ref 37–98)
ALT SERPL-CCNC: 13 U/L (ref 10–49)
ANION GAP SERPL CALC-SCNC: 6 MMOL/L (ref 0–18)
AST SERPL-CCNC: 19 U/L (ref ?–34)
BASOPHILS # BLD AUTO: 0.07 X10(3) UL (ref 0–0.2)
BASOPHILS NFR BLD AUTO: 1.3 %
BILIRUB SERPL-MCNC: 0.6 MG/DL (ref 0.3–1.2)
BUN BLD-MCNC: 13 MG/DL (ref 9–23)
BUN/CREAT SERPL: 13.7 (ref 10–20)
CALCIUM BLD-MCNC: 9.5 MG/DL (ref 8.7–10.4)
CHLORIDE SERPL-SCNC: 103 MMOL/L (ref 98–112)
CHOLEST SERPL-MCNC: 166 MG/DL (ref ?–200)
CO2 SERPL-SCNC: 27 MMOL/L (ref 21–32)
CREAT BLD-MCNC: 0.95 MG/DL (ref 0.55–1.02)
DEPRECATED RDW RBC AUTO: 39.2 FL (ref 35.1–46.3)
EGFRCR SERPLBLD CKD-EPI 2021: 85 ML/MIN/1.73M2 (ref 60–?)
EOSINOPHIL # BLD AUTO: 0.18 X10(3) UL (ref 0–0.7)
EOSINOPHIL NFR BLD AUTO: 3.2 %
ERYTHROCYTE [DISTWIDTH] IN BLOOD BY AUTOMATED COUNT: 11.8 % (ref 11–15)
FASTING PATIENT LIPID ANSWER: NO
FASTING STATUS PATIENT QL REPORTED: NO
GLOBULIN PLAS-MCNC: 2.7 G/DL (ref 2–3.5)
GLUCOSE BLD-MCNC: 88 MG/DL (ref 70–99)
HCT VFR BLD AUTO: 37.7 % (ref 35–48)
HDLC SERPL-MCNC: 71 MG/DL (ref 40–59)
HGB BLD-MCNC: 12.2 G/DL (ref 12–16)
IMM GRANULOCYTES # BLD AUTO: 0.01 X10(3) UL (ref 0–1)
IMM GRANULOCYTES NFR BLD: 0.2 %
LDLC SERPL CALC-MCNC: 81 MG/DL (ref ?–100)
LYMPHOCYTES # BLD AUTO: 1.79 X10(3) UL (ref 1–4)
LYMPHOCYTES NFR BLD AUTO: 32.1 %
MCH RBC QN AUTO: 29.3 PG (ref 26–34)
MCHC RBC AUTO-ENTMCNC: 32.4 G/DL (ref 31–37)
MCV RBC AUTO: 90.6 FL (ref 80–100)
MONOCYTES # BLD AUTO: 0.35 X10(3) UL (ref 0.1–1)
MONOCYTES NFR BLD AUTO: 6.3 %
NEUTROPHILS # BLD AUTO: 3.17 X10 (3) UL (ref 1.5–7.7)
NEUTROPHILS # BLD AUTO: 3.17 X10(3) UL (ref 1.5–7.7)
NEUTROPHILS NFR BLD AUTO: 56.9 %
NONHDLC SERPL-MCNC: 95 MG/DL (ref ?–130)
OSMOLALITY SERPL CALC.SUM OF ELEC: 282 MOSM/KG (ref 275–295)
PLATELET # BLD AUTO: 275 10(3)UL (ref 150–450)
POTASSIUM SERPL-SCNC: 4.1 MMOL/L (ref 3.5–5.1)
PROT SERPL-MCNC: 7.6 G/DL (ref 5.7–8.2)
RBC # BLD AUTO: 4.16 X10(6)UL (ref 3.8–5.3)
SODIUM SERPL-SCNC: 136 MMOL/L (ref 136–145)
TRIGL SERPL-MCNC: 74 MG/DL (ref 30–149)
VLDLC SERPL CALC-MCNC: 12 MG/DL (ref 0–30)
WBC # BLD AUTO: 5.6 X10(3) UL (ref 4–11)

## 2025-08-14 PROCEDURE — 85025 COMPLETE CBC W/AUTO DIFF WBC: CPT

## 2025-08-14 PROCEDURE — 36415 COLL VENOUS BLD VENIPUNCTURE: CPT

## 2025-08-14 PROCEDURE — 81514 NFCT DS BV&VAGINITIS DNA ALG: CPT | Performed by: PHYSICIAN ASSISTANT

## 2025-08-14 PROCEDURE — 88175 CYTOPATH C/V AUTO FLUID REDO: CPT | Performed by: PHYSICIAN ASSISTANT

## 2025-08-14 PROCEDURE — 80061 LIPID PANEL: CPT

## 2025-08-14 PROCEDURE — 87624 HPV HI-RISK TYP POOLED RSLT: CPT | Performed by: PHYSICIAN ASSISTANT

## 2025-08-14 PROCEDURE — 80053 COMPREHEN METABOLIC PANEL: CPT

## 2025-08-15 LAB
BV BACTERIA DNA VAG QL NAA+PROBE: NEGATIVE
C GLABRATA DNA VAG QL NAA+PROBE: NEGATIVE
C KRUSEI DNA VAG QL NAA+PROBE: NEGATIVE
CANDIDA DNA VAG QL NAA+PROBE: NEGATIVE
T VAGINALIS DNA VAG QL NAA+PROBE: NEGATIVE

## 2025-08-18 LAB — HPV E6+E7 MRNA CVX QL NAA+PROBE: NEGATIVE

## (undated) DEVICE — ABSORBABLE HEMOSTAT (OXIDIZED REGENERATED CELLULOSE): Brand: SURGICEL

## (undated) DEVICE — SUT PERMA- 2-0 18IN NABSRB BLK TIE SILK

## (undated) DEVICE — 3M™ TEGADERM™ TRANSPARENT FILM DRESSING FRAME STYLE, 1626W, 4 IN X 4-3/4 IN (10 CM X 12 CM), 50/CT 4CT/CASE: Brand: 3M™ TEGADERM™

## (undated) DEVICE — GAMMEX® PI HYBRID SIZE 8, STERILE POWDER-FREE SURGICAL GLOVE, POLYISOPRENE AND NEOPRENE BLEND: Brand: GAMMEX

## (undated) DEVICE — HEAD AND NECK: Brand: MEDLINE INDUSTRIES, INC.

## (undated) DEVICE — INSULATED BLADE ELECTRODE: Brand: EDGE

## (undated) DEVICE — APPLICATOR PREP 26ML CHG 2% ISO ALC 70%

## (undated) DEVICE — GOWN,SIRUS,FABRNF,RAGLAN,XL,ST,28/CS: Brand: MEDLINE

## (undated) DEVICE — GAMMEX® PI HYBRID SIZE 7.5, STERILE POWDER-FREE SURGICAL GLOVE, POLYISOPRENE AND NEOPRENE BLEND: Brand: GAMMEX

## (undated) DEVICE — SUT CHRM GUT 3-0 27IN SH ABSRB UD 26MM 1/2

## (undated) DEVICE — PACK CUSTTOM NECK ACCESSORY

## (undated) DEVICE — SUT PROL 5-0 18IN P-3 NABSRB BLU L13MM 3/8 CI

## (undated) DEVICE — SOLUTION IRRIG 1000ML 0.9% NACL USP BTL